# Patient Record
Sex: FEMALE | Race: WHITE | NOT HISPANIC OR LATINO | Employment: PART TIME | URBAN - METROPOLITAN AREA
[De-identification: names, ages, dates, MRNs, and addresses within clinical notes are randomized per-mention and may not be internally consistent; named-entity substitution may affect disease eponyms.]

---

## 2018-02-06 ENCOUNTER — OFFICE VISIT (OUTPATIENT)
Dept: OPTOMETRY | Facility: CLINIC | Age: 43
End: 2018-02-06
Payer: COMMERCIAL

## 2018-02-06 DIAGNOSIS — H16.141 SUPERFICIAL PUNCTATE KERATITIS OF RIGHT EYE: Primary | ICD-10-CM

## 2018-02-06 PROCEDURE — 92002 INTRM OPH EXAM NEW PATIENT: CPT | Mod: S$GLB,,, | Performed by: OPTOMETRIST

## 2018-02-06 PROCEDURE — 99999 PR PBB SHADOW E&M-EST. PATIENT-LVL II: CPT | Mod: PBBFAC,,, | Performed by: OPTOMETRIST

## 2018-02-06 RX ORDER — NEOMYCIN SULFATE, POLYMYXIN B SULFATE AND DEXAMETHASONE 3.5; 10000; 1 MG/ML; [USP'U]/ML; MG/ML
1 SUSPENSION/ DROPS OPHTHALMIC 4 TIMES DAILY
Qty: 5 ML | Refills: 0 | Status: SHIPPED | OUTPATIENT
Start: 2018-02-06 | End: 2018-02-12

## 2018-02-06 NOTE — PROGRESS NOTES
HPI     Presenting Complaint: Pt here today for red irritated right eye x 5 days.   Pt states yesterday right eye was bright eye red. Pt states for the last 2   days started itching and today started having pain. Pt states eye was a   little crusty this morning.     (+) Pain level 2 today in right eye    Ophthalmic medication / drops:   Pt has tried visine and [facility] lubricant drops as needed. Pt states she used   a friend's gentamycin ointment 2 times 4 days ago.    (-) headaches  (-) diplopia   (-) flashes / (-) floaters      Last edited by Bobby Hawk, OD on 2/6/2018  2:52 PM. (History)            Assessment /Plan     For exam results, see Encounter Report.    Superficial punctate keratitis of right eye  -     neomycin-polymyxin-dexamethasone (MAXITROL) 3.5mg/mL-10,000 unit/mL-0.1 % DrpS; Place 1 drop into the right eye 4 (four) times daily.  Dispense: 5 mL; Refill: 0      SPK inferior cornea OD. Discussed findings. Start maxitrol: 1 gt qid OD, shake bottle well. Start OTC preservative free drops every 1-2 hrs. Return in 1 week for f/u, sooner if worsening or no alleviation.

## 2018-02-12 ENCOUNTER — OFFICE VISIT (OUTPATIENT)
Dept: OPTOMETRY | Facility: CLINIC | Age: 43
End: 2018-02-12
Payer: COMMERCIAL

## 2018-02-12 DIAGNOSIS — H52.7 REFRACTIVE ERROR: ICD-10-CM

## 2018-02-12 DIAGNOSIS — H16.141 SUPERFICIAL PUNCTATE KERATITIS OF RIGHT EYE: Primary | ICD-10-CM

## 2018-02-12 DIAGNOSIS — H04.123 DRY EYE SYNDROME, BILATERAL: ICD-10-CM

## 2018-02-12 PROCEDURE — 92014 COMPRE OPH EXAM EST PT 1/>: CPT | Mod: S$GLB,,, | Performed by: OPTOMETRIST

## 2018-02-12 PROCEDURE — 99999 PR PBB SHADOW E&M-EST. PATIENT-LVL II: CPT | Mod: PBBFAC,,, | Performed by: OPTOMETRIST

## 2018-02-12 NOTE — PROGRESS NOTES
HPI     Presenting Complaint: Pt here today for yearly eye exam and Superficial   punctate keratitis of right eye follow up.    (+) Pt states improved irritation. Pt states does not get the sticking   feeling in right eye. Pt states woke up with sticking in left eye this   morning, used lubricating drop and feels better.    Ophthalmic medication / drops:  (+) OTC preservative free art tears 3 to 4 times a day  (+) Maxitrol 4 x day right eye only    (-) headaches  (-) diplopia   (-) flashes / (-) floaters       Last edited by Bobby Hawk, OD on 2/12/2018  3:39 PM. (History)            Assessment /Plan     For exam results, see Encounter Report.    Superficial punctate keratitis of right eye    Dry eye syndrome, bilateral    Refractive error      Resolved SPK OD. ALEXUS OU. Discussed ocular affects of dry eyes. Recommend OTC Systane Balance artificial tears four times a day in OU. Discussed chronicity of ALEXUS. RTC if symptoms not alleviated by continued use of artificial tears.     Good uncorrected distance and near vision. Denies refraction, return prn for spec Rx.       RTC in 1 year for comprehensive eye exam, or sooner prn.

## 2018-05-07 ENCOUNTER — OFFICE VISIT (OUTPATIENT)
Dept: RHEUMATOLOGY | Facility: CLINIC | Age: 43
End: 2018-05-07
Payer: COMMERCIAL

## 2018-05-07 ENCOUNTER — TELEPHONE (OUTPATIENT)
Dept: NEUROLOGY | Facility: CLINIC | Age: 43
End: 2018-05-07

## 2018-05-07 ENCOUNTER — HOSPITAL ENCOUNTER (OUTPATIENT)
Dept: RADIOLOGY | Facility: HOSPITAL | Age: 43
Discharge: HOME | End: 2018-05-07
Attending: INTERNAL MEDICINE
Payer: COMMERCIAL

## 2018-05-07 VITALS
WEIGHT: 188.81 LBS | HEIGHT: 70 IN | SYSTOLIC BLOOD PRESSURE: 120 MMHG | DIASTOLIC BLOOD PRESSURE: 76 MMHG | BODY MASS INDEX: 27.03 KG/M2 | TEMPERATURE: 99 F | HEART RATE: 79 BPM

## 2018-05-07 DIAGNOSIS — H04.123 DRY EYES: ICD-10-CM

## 2018-05-07 DIAGNOSIS — R68.2 DRY MOUTH: ICD-10-CM

## 2018-05-07 DIAGNOSIS — M25.572 PAIN IN JOINTS OF BOTH FEET: Primary | ICD-10-CM

## 2018-05-07 DIAGNOSIS — M25.531 PAIN IN BOTH WRISTS: ICD-10-CM

## 2018-05-07 DIAGNOSIS — H53.2 DOUBLE VISION: ICD-10-CM

## 2018-05-07 DIAGNOSIS — R51.9 FREQUENT HEADACHES: ICD-10-CM

## 2018-05-07 DIAGNOSIS — R53.83 FATIGUE, UNSPECIFIED TYPE: ICD-10-CM

## 2018-05-07 DIAGNOSIS — R63.5 WEIGHT GAIN: ICD-10-CM

## 2018-05-07 DIAGNOSIS — M25.571 PAIN IN JOINTS OF BOTH FEET: Primary | ICD-10-CM

## 2018-05-07 DIAGNOSIS — R20.2 PARESTHESIAS: ICD-10-CM

## 2018-05-07 DIAGNOSIS — M25.572 PAIN IN JOINTS OF BOTH FEET: ICD-10-CM

## 2018-05-07 DIAGNOSIS — M25.532 PAIN IN BOTH WRISTS: ICD-10-CM

## 2018-05-07 DIAGNOSIS — M25.571 PAIN IN JOINTS OF BOTH FEET: ICD-10-CM

## 2018-05-07 PROCEDURE — 77077 JOINT SURVEY SINGLE VIEW: CPT | Mod: TC

## 2018-05-07 PROCEDURE — 99205 OFFICE O/P NEW HI 60 MIN: CPT | Mod: S$GLB,,, | Performed by: INTERNAL MEDICINE

## 2018-05-07 PROCEDURE — 3008F BODY MASS INDEX DOCD: CPT | Mod: CPTII,S$GLB,, | Performed by: INTERNAL MEDICINE

## 2018-05-07 PROCEDURE — 71046 X-RAY EXAM CHEST 2 VIEWS: CPT | Mod: 26,,, | Performed by: RADIOLOGY

## 2018-05-07 PROCEDURE — 77077 JOINT SURVEY SINGLE VIEW: CPT | Mod: 26,,, | Performed by: RADIOLOGY

## 2018-05-07 PROCEDURE — 99999 PR PBB SHADOW E&M-EST. PATIENT-LVL III: CPT | Mod: PBBFAC,,, | Performed by: INTERNAL MEDICINE

## 2018-05-07 PROCEDURE — 71046 X-RAY EXAM CHEST 2 VIEWS: CPT | Mod: TC

## 2018-05-07 RX ORDER — MECLIZINE HYDROCHLORIDE 25 MG/1
TABLET ORAL 2 TIMES DAILY PRN
COMMUNITY
Start: 2017-01-25 | End: 2020-09-24

## 2018-05-07 RX ORDER — NITROFURANTOIN 25; 75 MG/1; MG/1
CAPSULE ORAL 2 TIMES DAILY
COMMUNITY
End: 2018-07-24 | Stop reason: SDUPTHER

## 2018-05-07 RX ORDER — VALACYCLOVIR HYDROCHLORIDE 1 G/1
TABLET, FILM COATED ORAL CONTINUOUS PRN
COMMUNITY
End: 2020-09-24 | Stop reason: SDUPTHER

## 2018-05-07 ASSESSMENT — ROUTINE ASSESSMENT OF PATIENT INDEX DATA (RAPID3)
PAIN SCORE: 3
MDHAQ FUNCTION SCORE: .1
PSYCHOLOGICAL DISTRESS SCORE: 1.1
WHEN YOU AWAKENED IN THE MORNING OVER THE LAST WEEK, PLEASE INDICATE THE AMOUNT OF TIME IT TAKES UNTIL YOU ARE AS LIMBER AS YOU WILL BE FOR THE DAY: 1 HOUR
AM STIFFNESS SCORE: 1, YES
TOTAL RAPID3 SCORE: 3.11
PATIENT GLOBAL ASSESSMENT SCORE: 6
FATIGUE SCORE: 8

## 2018-05-07 NOTE — PROGRESS NOTES
Subjective:       Patient ID: Arcelia Moss is a 42 y.o. female.    Chief Complaint: Joint pain.    HPI:  Arcelia Moss is a 42 y.o. female reports 2017 had left wrist pain x3 days resolved then same on right wrist. Saw nurse practitioner who did uric acid and rheumatoid arthritis test that was negative.  Lower back tingling (pins and needle) and numb with flush skin.  Then same on left side of back.  Four to five times a week awakens and feels one or 2 extremity is numb.    Two years ago fell down carpeted steps.  Thinks foot slipped.  No incontinence of bowel.  Incontinence of bladder with laughing.  Feels tight in general.    Hands swell.  Had to get new wedding ring.  In morning feet intermittently stiff and hurt.  Pain in hands and feet 5-6/10 ache 3x a week.  Improves with continue movement, ice and elevation.  Unable to run anymore due to foot pain and ankle swelling.  Right eye blood shot and irritated recently.  Eye doctor noted both eyes dry.  Very fatigued despite amount of sleep.  Gained 25 pounds in last year.    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  Symptoms returning but not as bad.  Morning stiffness on average for 1 hour.    Cystic acne treated for 6 months to a year in 2013 treated with clindamycin/treintoin.  Pregnancy in 2005 complicated by eczema and pregnancy induced Menieres.    Primary gave meclizine for dizziness last year but has not used.    Six days lately of tingling in back.  Double vision with headaches.  2-3 headaches a month that cause her not to function.  Sensitive to light, smells, blurry vision, floaters and flashing lights with headaches.      Lupus Review of Systems  Alopecia: no  Photosensitivity: no   Raynaud's: no  Oral or nasal ulcers: no (fever blister on lips)  Rashes: Redness of back  No pleurisy or pericarditis.  No seizures,  "psychosis, or stroke.  No venous or arterial clots.  Pregnancy hx (if applicable): one miscarriage at 9 weeks in 4/2005; Two successful pregnancies after.  Twin pregnancy complicated by HTN and pregnancy induced Menieres delivered 1 month early             Review of Systems   Constitutional: Positive for fatigue and unexpected weight change (25 pounds weight gain).   HENT:        Dry mouth   Eyes: Positive for redness.        Dry eyes   Respiratory: Positive for cough.         Allergies   Cardiovascular: Negative.    Gastrointestinal:        Difficulty swallowing to solids   Endocrine: Negative.    Genitourinary: Negative.    Musculoskeletal: Positive for arthralgias and joint swelling.   Skin: Positive for color change.   Allergic/Immunologic: Negative.    Neurological: Positive for headaches.        Tingling in left side of back   Hematological: Negative.    Psychiatric/Behavioral: Negative.          Objective:   /76 (BP Location: Right arm, Patient Position: Sitting, BP Method: Small (Automatic))   Pulse 79   Temp 98.5 °F (36.9 °C) (Oral)   Ht 5' 9.5" (1.765 m)   Wt 85.6 kg (188 lb 12.8 oz)   LMP 04/23/2018   BMI 27.48 kg/m²      Physical Exam   Constitutional: She is oriented to person, place, and time and well-developed, well-nourished, and in no distress.   HENT:   Head: Normocephalic.   Eyes: Conjunctivae and EOM are normal.   Neck: Neck supple.   Cardiovascular: Normal rate, regular rhythm and normal heart sounds.    Pulmonary/Chest: Effort normal and breath sounds normal.   Abdominal: Soft. Bowel sounds are normal.   Neurological: She is alert and oriented to person, place, and time. Gait normal.   Skin: Skin is warm and dry.     Psychiatric: Mood and affect normal.   Musculoskeletal:   28 joint count: 0 swollen and 0 tender            Assessment:       1.  Joint pains  2.  Fatigue  3.  Paresthesias  4.  Back pain  5.  Dry eyes and mouth  6.  Weight gain  7.  Dysphagia  8.  Headaches  9.  Double " vision  Plan:       1.  Labs  2.  CXR  3.  Consult neurology for frequent headaches, double vision and paresthesias    RTO 3 months/prn

## 2018-05-07 NOTE — TELEPHONE ENCOUNTER
----- Message from Estelle Arriaga sent at 5/7/2018  3:00 PM CDT -----  Contact: Pt can be reached at [phone]  Pt is calling to schedule an appt for MS.  Pt was referred by Dr.T Magana      Please contact pt asap   Thank you!

## 2018-05-08 ENCOUNTER — LAB VISIT (OUTPATIENT)
Dept: LAB | Facility: HOSPITAL | Age: 43
End: 2018-05-08
Attending: INTERNAL MEDICINE
Payer: COMMERCIAL

## 2018-05-08 ENCOUNTER — PATIENT MESSAGE (OUTPATIENT)
Dept: RHEUMATOLOGY | Facility: CLINIC | Age: 43
End: 2018-05-08

## 2018-05-08 DIAGNOSIS — R53.83 FATIGUE, UNSPECIFIED TYPE: ICD-10-CM

## 2018-05-08 DIAGNOSIS — H53.2 DOUBLE VISION: ICD-10-CM

## 2018-05-08 DIAGNOSIS — M25.531 PAIN IN BOTH WRISTS: ICD-10-CM

## 2018-05-08 DIAGNOSIS — M25.532 PAIN IN BOTH WRISTS: ICD-10-CM

## 2018-05-08 DIAGNOSIS — R68.2 DRY MOUTH: ICD-10-CM

## 2018-05-08 DIAGNOSIS — R20.2 PARESTHESIAS: ICD-10-CM

## 2018-05-08 DIAGNOSIS — H04.123 DRY EYES: ICD-10-CM

## 2018-05-08 DIAGNOSIS — R51.9 FREQUENT HEADACHES: ICD-10-CM

## 2018-05-08 DIAGNOSIS — M25.571 PAIN IN JOINTS OF BOTH FEET: ICD-10-CM

## 2018-05-08 DIAGNOSIS — R63.5 WEIGHT GAIN: ICD-10-CM

## 2018-05-08 DIAGNOSIS — M25.572 PAIN IN JOINTS OF BOTH FEET: ICD-10-CM

## 2018-05-08 LAB
25(OH)D3+25(OH)D2 SERPL-MCNC: 27 NG/ML
ALBUMIN SERPL BCP-MCNC: 3.8 G/DL
ALP SERPL-CCNC: 68 U/L
ALT SERPL W/O P-5'-P-CCNC: 14 U/L
ANION GAP SERPL CALC-SCNC: 7 MMOL/L
AST SERPL-CCNC: 15 U/L
BASOPHILS # BLD AUTO: 0 K/UL
BASOPHILS NFR BLD: 0.5 %
BILIRUB SERPL-MCNC: 0.5 MG/DL
BUN SERPL-MCNC: 13 MG/DL
CALCIUM SERPL-MCNC: 9.2 MG/DL
CCP AB SER IA-ACNC: <0.5 U/ML
CHLORIDE SERPL-SCNC: 103 MMOL/L
CK SERPL-CCNC: 77 U/L
CO2 SERPL-SCNC: 29 MMOL/L
CREAT SERPL-MCNC: 0.8 MG/DL
CRP SERPL-MCNC: 0.6 MG/L
DIFFERENTIAL METHOD: ABNORMAL
EOSINOPHIL # BLD AUTO: 0.4 K/UL
EOSINOPHIL NFR BLD: 5.7 %
ERYTHROCYTE [DISTWIDTH] IN BLOOD BY AUTOMATED COUNT: 13.2 %
ERYTHROCYTE [SEDIMENTATION RATE] IN BLOOD BY WESTERGREN METHOD: 5 MM/HR
EST. GFR  (AFRICAN AMERICAN): >60 ML/MIN/1.73 M^2
EST. GFR  (NON AFRICAN AMERICAN): >60 ML/MIN/1.73 M^2
GLUCOSE SERPL-MCNC: 82 MG/DL
HCT VFR BLD AUTO: 37.6 %
HGB BLD-MCNC: 12.4 G/DL
LYMPHOCYTES # BLD AUTO: 1.6 K/UL
LYMPHOCYTES NFR BLD: 23.4 %
MCH RBC QN AUTO: 28.6 PG
MCHC RBC AUTO-ENTMCNC: 33.1 G/DL
MCV RBC AUTO: 86 FL
MONOCYTES # BLD AUTO: 0.4 K/UL
MONOCYTES NFR BLD: 5.2 %
NEUTROPHILS # BLD AUTO: 4.5 K/UL
NEUTROPHILS NFR BLD: 65.2 %
PLATELET # BLD AUTO: 234 K/UL
PMV BLD AUTO: 8 FL
POTASSIUM SERPL-SCNC: 3.6 MMOL/L
PROT SERPL-MCNC: 7 G/DL
RBC # BLD AUTO: 4.35 M/UL
RHEUMATOID FACT SERPL-ACNC: <10 IU/ML
SODIUM SERPL-SCNC: 139 MMOL/L
TSH SERPL DL<=0.005 MIU/L-ACNC: 0.95 UIU/ML
WBC # BLD AUTO: 6.9 K/UL

## 2018-05-08 PROCEDURE — 86431 RHEUMATOID FACTOR QUANT: CPT

## 2018-05-08 PROCEDURE — 86140 C-REACTIVE PROTEIN: CPT

## 2018-05-08 PROCEDURE — 86200 CCP ANTIBODY: CPT

## 2018-05-08 PROCEDURE — 85651 RBC SED RATE NONAUTOMATED: CPT

## 2018-05-08 PROCEDURE — 84443 ASSAY THYROID STIM HORMONE: CPT

## 2018-05-08 PROCEDURE — 86235 NUCLEAR ANTIGEN ANTIBODY: CPT

## 2018-05-08 PROCEDURE — 80074 ACUTE HEPATITIS PANEL: CPT

## 2018-05-08 PROCEDURE — 85025 COMPLETE CBC W/AUTO DIFF WBC: CPT

## 2018-05-08 PROCEDURE — 36415 COLL VENOUS BLD VENIPUNCTURE: CPT

## 2018-05-08 PROCEDURE — 80053 COMPREHEN METABOLIC PANEL: CPT

## 2018-05-08 PROCEDURE — 86038 ANTINUCLEAR ANTIBODIES: CPT

## 2018-05-08 PROCEDURE — 82306 VITAMIN D 25 HYDROXY: CPT

## 2018-05-08 PROCEDURE — 82550 ASSAY OF CK (CPK): CPT

## 2018-05-08 PROCEDURE — 86703 HIV-1/HIV-2 1 RESULT ANTBDY: CPT

## 2018-05-08 NOTE — TELEPHONE ENCOUNTER
----- Message from Ansley Mustafa sent at 5/7/2018  4:14 PM CDT -----  Contact: Pt  Good afternoon I was not sure where to send the message directly, ort to what pool.     Call back number: Patient Returning Call    Who Called: Pt  Who Left Message for Patient: Peggy  Does the patient know what this is regarding?: Scheduling an apt  Communication Preference: [phone]  Additional Information: N/A

## 2018-05-08 NOTE — TELEPHONE ENCOUNTER
Call returned to pt. Pt states that she is having some vague but persistent neurological symptoms and is being referred by her rheumatologist. Screening labs done today. Appt scheduled with Dr Bearden on 5/24 at 8am. Pt is agreeable to this and is aware of our location.

## 2018-05-09 ENCOUNTER — PATIENT MESSAGE (OUTPATIENT)
Dept: RHEUMATOLOGY | Facility: CLINIC | Age: 43
End: 2018-05-09

## 2018-05-09 LAB
ANA SER QL IF: NORMAL
HAV IGM SERPL QL IA: NEGATIVE
HBV CORE IGM SERPL QL IA: NEGATIVE
HBV SURFACE AG SERPL QL IA: NEGATIVE
HCV AB SERPL QL IA: NEGATIVE
HIV 1+2 AB+HIV1 P24 AG SERPL QL IA: NEGATIVE

## 2018-05-10 LAB
ANTI-SSA ANTIBODY: 5.18 EU
ANTI-SSA INTERPRETATION: NEGATIVE

## 2018-05-11 ENCOUNTER — PATIENT MESSAGE (OUTPATIENT)
Dept: RHEUMATOLOGY | Facility: CLINIC | Age: 43
End: 2018-05-11

## 2018-05-24 ENCOUNTER — OFFICE VISIT (OUTPATIENT)
Dept: NEUROLOGY | Facility: CLINIC | Age: 43
End: 2018-05-24
Payer: COMMERCIAL

## 2018-05-24 ENCOUNTER — LAB VISIT (OUTPATIENT)
Dept: LAB | Facility: HOSPITAL | Age: 43
End: 2018-05-24
Attending: PSYCHIATRY & NEUROLOGY
Payer: COMMERCIAL

## 2018-05-24 VITALS
BODY MASS INDEX: 26.69 KG/M2 | DIASTOLIC BLOOD PRESSURE: 83 MMHG | WEIGHT: 186.44 LBS | SYSTOLIC BLOOD PRESSURE: 130 MMHG | HEIGHT: 70 IN | HEART RATE: 84 BPM

## 2018-05-24 DIAGNOSIS — R20.2 PARESTHESIA AND PAIN OF BOTH UPPER EXTREMITIES: ICD-10-CM

## 2018-05-24 DIAGNOSIS — M79.602 PARESTHESIA AND PAIN OF BOTH UPPER EXTREMITIES: Primary | ICD-10-CM

## 2018-05-24 DIAGNOSIS — R29.818 FOCAL NEUROLOGICAL DEFICIT: ICD-10-CM

## 2018-05-24 DIAGNOSIS — R20.2 PARESTHESIA AND PAIN OF BOTH UPPER EXTREMITIES: Primary | ICD-10-CM

## 2018-05-24 DIAGNOSIS — M79.601 PARESTHESIA AND PAIN OF BOTH UPPER EXTREMITIES: ICD-10-CM

## 2018-05-24 DIAGNOSIS — R53.83 OTHER FATIGUE: ICD-10-CM

## 2018-05-24 DIAGNOSIS — R26.81 GAIT INSTABILITY: ICD-10-CM

## 2018-05-24 DIAGNOSIS — M79.601 PARESTHESIA AND PAIN OF BOTH UPPER EXTREMITIES: Primary | ICD-10-CM

## 2018-05-24 DIAGNOSIS — M79.602 PARESTHESIA AND PAIN OF BOTH UPPER EXTREMITIES: ICD-10-CM

## 2018-05-24 LAB
C3 SERPL-MCNC: 128 MG/DL
C4 SERPL-MCNC: 18 MG/DL
CERULOPLASMIN SERPL-MCNC: 28 MG/DL
FOLATE SERPL-MCNC: 11.5 NG/ML
HCYS SERPL-SCNC: 8.8 UMOL/L
RHEUMATOID FACT SERPL-ACNC: <10 IU/ML
T4 FREE SERPL-MCNC: 0.98 NG/DL
VIT B12 SERPL-MCNC: 638 PG/ML

## 2018-05-24 PROCEDURE — 3008F BODY MASS INDEX DOCD: CPT | Mod: CPTII,S$GLB,, | Performed by: PSYCHIATRY & NEUROLOGY

## 2018-05-24 PROCEDURE — 83090 ASSAY OF HOMOCYSTEINE: CPT

## 2018-05-24 PROCEDURE — 82525 ASSAY OF COPPER: CPT

## 2018-05-24 PROCEDURE — 86160 COMPLEMENT ANTIGEN: CPT

## 2018-05-24 PROCEDURE — 85613 RUSSELL VIPER VENOM DILUTED: CPT

## 2018-05-24 PROCEDURE — 86618 LYME DISEASE ANTIBODY: CPT

## 2018-05-24 PROCEDURE — 86592 SYPHILIS TEST NON-TREP QUAL: CPT

## 2018-05-24 PROCEDURE — 86147 CARDIOLIPIN ANTIBODY EA IG: CPT

## 2018-05-24 PROCEDURE — 99999 PR PBB SHADOW E&M-EST. PATIENT-LVL III: CPT | Mod: PBBFAC,,, | Performed by: PSYCHIATRY & NEUROLOGY

## 2018-05-24 PROCEDURE — 84439 ASSAY OF FREE THYROXINE: CPT

## 2018-05-24 PROCEDURE — 86146 BETA-2 GLYCOPROTEIN ANTIBODY: CPT | Mod: 59

## 2018-05-24 PROCEDURE — 86334 IMMUNOFIX E-PHORESIS SERUM: CPT

## 2018-05-24 PROCEDURE — 84165 PROTEIN E-PHORESIS SERUM: CPT | Mod: 26,,, | Performed by: PATHOLOGY

## 2018-05-24 PROCEDURE — 99205 OFFICE O/P NEW HI 60 MIN: CPT | Mod: S$GLB,,, | Performed by: PSYCHIATRY & NEUROLOGY

## 2018-05-24 PROCEDURE — 84165 PROTEIN E-PHORESIS SERUM: CPT

## 2018-05-24 PROCEDURE — 82607 VITAMIN B-12: CPT

## 2018-05-24 PROCEDURE — 86431 RHEUMATOID FACTOR QUANT: CPT

## 2018-05-24 PROCEDURE — 86160 COMPLEMENT ANTIGEN: CPT | Mod: 59

## 2018-05-24 PROCEDURE — 86334 IMMUNOFIX E-PHORESIS SERUM: CPT | Mod: 26,,, | Performed by: PATHOLOGY

## 2018-05-24 PROCEDURE — 83516 IMMUNOASSAY NONANTIBODY: CPT | Mod: 59

## 2018-05-24 PROCEDURE — 86255 FLUORESCENT ANTIBODY SCREEN: CPT | Mod: 91

## 2018-05-24 PROCEDURE — 82746 ASSAY OF FOLIC ACID SERUM: CPT

## 2018-05-24 PROCEDURE — 82390 ASSAY OF CERULOPLASMIN: CPT

## 2018-05-24 PROCEDURE — 84630 ASSAY OF ZINC: CPT

## 2018-05-24 PROCEDURE — 36415 COLL VENOUS BLD VENIPUNCTURE: CPT

## 2018-05-24 PROCEDURE — 86225 DNA ANTIBODY NATIVE: CPT

## 2018-05-24 RX ORDER — CHOLECALCIFEROL (VITAMIN D3) 25 MCG
1000 TABLET ORAL DAILY
COMMUNITY

## 2018-05-24 NOTE — LETTER
May 29, 2018      Stella Rock MD  [address]  [facility] [address]           [facility]  [address]  [facility] [address]  Phone: [phone]          Patient: Arcelia Moss   MR Number: [id number]   YOB: 1975   Date of Visit: 5/24/2018       Dear Dr. Stella Rock:    Thank you for referring Arcelia Moss to me for evaluation. Attached you will find relevant portions of my assessment and plan of care.    If you have questions, please do not hesitate to call me. I look forward to following Arcelia Moss along with you.    Sincerely,    Mindi Bearden MD    Enclosure  CC:  No Recipients    If you would like to receive this communication electronically, please contact [email or web address] or [phone] to request more information on [employer] [facility] access.    For providers and/or their staff who would like to refer a patient to [facility], please contact us through our one-stop-shop provider referral line, [facility], at [phone].    If you feel you have received this communication in error or would no longer like to receive these types of communications, please e-mail [email or web address]

## 2018-05-24 NOTE — PROGRESS NOTES
"I saw Arcelia Moss as a new patient today for multiple sclerosis evaluation.  She comes in to [facility] care and is referred by Dr. Magana.  She is accompanied by no one.     History of Present Illness  The patient is a 42 y.o. RH female with chronic headaches, dry eyes, polycystic ovaries, and occasional joint pain/swelling, who presents for MS evaluation.  Possibly onset of neurologic symptoms was about 2 years ago. While fishing, she noticed a flushed [place] on her back (no bullseye) just below the closure of her bra strap that was numb and tingly. It went away the next day. No known bug bites and family didn't notice any marks when screening her skin either. Then summer 2017, she had numbness and tightness in lower back that eventually resolved. Skin was flushed at that time. In the last 6-9 months, she noticed N/T - mostly on left side of body than right. Whenever she wakes up, it seems one of her extremities will be asleep. Right foot and hand goes numb sometimes, but left side is more frequent. Twice upon awakening, left leg from knee down felt like it was asleep. Leg felt so numb that she couldn't move it and had to manually move it with her hand. Afterwards feeling came back. There was one 2 week period where she had consistent n/t from left shoulder to waist. Since then, the will have numbness on left side, but may only be part of her torso. Also has an episode of ulnar distribution n/t in hand and forearm for about an hour, then there was heaviness for about 1 day.   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    Followed with rheumatology for:  2.5 years ago - left wrist pain and swelling, resolved with ibuprofen. One week later, right wrist with same symptoms, resolved with splint and ibuprofen as well. Summer 2016 - tightness in lower back. Felt like "back labor," which still happens infrequently but left side > right. For past " "1-2 years, she will wake up with joint stiffness and feet and ankle mild to moderate swelling.     Review of systems:   Vertigo/Dizziness: more than 1.5 years ago, would have dizzy spells. Prescribed antivert. Last taken 9 months ago, but she did have a vertiginous spell in past week. Diagnosed with pregnancy induced Meniere's in previous pregnancy.  Headaches: chronic, thinks it's mostly sinus headaches and infrequently has "migraine-like" headaches with eye pain and dull lights in vision  Visual Symptoms: Yes - diagnosed with dry eye and right keratitis   Bladder Dysfunction: Yes - stress incontinence after childbirth   Bowel Dysfunction: No current constipation/incontinence/diarrhea  Pain: Yes - as per hpi   Skin Breakdown: Yes - occasionally gets cystic lesions, treated by dermatology   Cognitive: in past year, she has had some difficulty with memory. Believes that it's related to stress  Dysphagia: Yes - esophageal tightening. 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 Fell down 4-5 stairs about 2 years ago (mechanical), and fell down back steps while rushing out of house about one year ago  Mood Disorder: worries a lot, has always been a generally anxious person.   Fatigue: Yes - increased, doesn't matter how much sleep she's gotten.   Sleep Disturbance: No. No known snoring or apnea.  Sexual Dysfunction: Not Assessed   Heat sensitivity: No, but does get overheated faster      Past Medical History:   Diagnosis Date    Meniere disease     Pregnancy induced    Polycystic ovarian syndrome        Past Surgical History:   Procedure Laterality Date     SECTION      DILATION AND CURETTAGE OF UTERUS      TUBAL LIGATION      then reversal due to pain     tubal reversal      Due to pain after tubal ligation " "      Family History   Problem Relation Age of Onset    Osteoarthritis Mother     Meniere's disease Paternal Aunt     Kidney disease Maternal Grandmother     Diabetes Mellitus Maternal Grandmother     Hyperlipidemia Maternal Grandmother     Hypertension Maternal Grandmother     Ovarian cancer Neg Hx     Colon cancer Neg Hx     Breast cancer Neg Hx     Glaucoma Neg Hx     Inflammatory bowel disease Neg Hx     Lupus Neg Hx     Psoriasis Neg Hx     Thyroid disease Neg Hx        Social History     Social History    Marital status:      Spouse name: N/A    Number of children: N/A    Years of education: N/A     Social History Main Topics    Smoking status: Never Smoker    Smokeless tobacco: Never Used      Comment: ; counselor    Alcohol use No    Drug use: No    Sexual activity: Yes     Partners: Male     Birth control/ protection: Surgical      Comment:  to [employer]     Other Topics Concern    Not on file     Social History Narrative    No narrative on file     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    NITROFURANTOIN, MACROCRYSTAL-MONOHYDRATE, (MACROBID) 100 MG CAPSULE    Take by mouth.    VALACYCLOVIR (VALTREX) 1000 MG TABLET    Take by mouth.    VITAMIN D 1000 UNITS TAB    Take 1,000 Units by mouth once daily.         Physical Exam    Vitals:    05/24/18 0808   BP: 130/83   Pulse: 84   Weight: 84.6 kg (186 lb 6.7 oz)   Height: 5' 9.5" (1.765 m)       In general, the patient is well nourished.    Fundi are normal bilaterally.    MENTAL STATUS: language is fluent, normal verbal comprehension, short-term and remote memory is intact, " attention is normal, patient is alert and oriented x 3, fund of knowlege is appropriate by vocabulary.     CRANIAL NERVE EXAM:  There is no internuclear ophthalmoplegia.  Extraocular muscles are intact. Pupils are equal, round, and reactive to light. No facial asymmetry. Facial sensation is intact bilaterally. There is no dysarthria. Uvula is midline, and palate moves symmetrically. Shoulder shrug intact bilaterlly. Tongue protrusion is midline. Hearing is grossly intact.    MOTOR EXAM: Normal bulk and tone throughout UE and LE bilaterally.   No pronator drift; rapid sequential movements are normal; Strength is  5/5 in all groups in the lower extremities and upper extremities;    REFLEXES: 2+ and symmetric throughout in all four extremeties; toes are down bilaterally    SENSORY EXAM: Normal to light touch, pinprick throughout. Decreased PP sensation on left back from shoulder to lumbar region compared to right.    COORDINATION: Normal finger-to-nose and HKS exam     GAIT: Narrow based and stable. Heel/toe/tandem intact        IMAGING (personally reviewed):      LABS:  Lab Results   Component Value Date    WBC 6.90 05/08/2018    HGB 12.4 05/08/2018    HCT 37.6 05/08/2018    MCV 86 05/08/2018     05/08/2018         Chemistry        Component Value Date/Time     05/08/2018 1115    K 3.6 05/08/2018 1115     05/08/2018 1115    CO2 29 05/08/2018 1115    BUN 13 05/08/2018 1115    CREATININE 0.8 05/08/2018 1115    GLU 82 05/08/2018 1115        Component Value Date/Time    CALCIUM 9.2 05/08/2018 1115    ALKPHOS 68 05/08/2018 1115    AST 15 05/08/2018 1115    ALT 14 05/08/2018 1115    BILITOT 0.5 05/08/2018 1115    ESTGFRAFRICA >60 05/08/2018 1115    EGFRNONAA >60 05/08/2018 1115            Lab Results   Component Value Date    ALBUMIN 3.8 05/08/2018 5/8/18 - normal/unremarkable: SIMONE, hep panel, HIV, TSH, SSA, RF, ESR, CCP, CK, CRP, CMP, CBC. Low- Vit D 27      Diagnosis/Assessment/Plan:       1.  Paresthesias, gait instability, fatigue, intermittent LE weakness  · Assessment: cannot rule out MS vs MS mimickers. Will assess for both at this time.   · Imaging: MRI brain, c/t spine w/wo  · Labs: anti-ds dna ab, anca, lyme abs, beta2 glycoproteins, c3/c4 complement, cardiolipin ab, ceruloplasm, copper, drvvt, folate, gliadin ab, homocysteine, spep/karin, RF, RPR, T4, vit B12, zinc  · Continue vit D supplementation      Over 50% of the 80 min appt was spent counseling the patient about further w/u and diagnoses.    F/u in 2-4 weeks after imaging    Mindi Bearden MD

## 2018-05-25 LAB
ALBUMIN SERPL ELPH-MCNC: 4.11 G/DL
ALPHA1 GLOB SERPL ELPH-MCNC: 0.27 G/DL
ALPHA2 GLOB SERPL ELPH-MCNC: 0.74 G/DL
ANCA AB TITR SER IF: NORMAL TITER
B BURGDOR AB SER IA-ACNC: 0.1 INDEX VALUE
B-GLOBULIN SERPL ELPH-MCNC: 0.9 G/DL
CARDIOLIPIN IGG SER IA-ACNC: <9.4 GPL
CARDIOLIPIN IGM SER IA-ACNC: <9.4 MPL
DSDNA AB SER-ACNC: NORMAL [IU]/ML
GAMMA GLOB SERPL ELPH-MCNC: 1.38 G/DL
GLIADIN PEPTIDE IGA SER-ACNC: 7 UNITS
GLIADIN PEPTIDE IGG SER-ACNC: 3 UNITS
INTERPRETATION SERPL IFE-IMP: NORMAL
LA PPP-IMP: NEGATIVE
P-ANCA TITR SER IF: NORMAL TITER
PATHOLOGIST INTERPRETATION IFE: NORMAL
PATHOLOGIST INTERPRETATION SPE: NORMAL
PROT SERPL-MCNC: 7.4 G/DL
RPR SER QL: NORMAL
ZINC SERPL-MCNC: 68 UG/DL (ref 60–130)

## 2018-05-26 LAB
B2 GLYCOPROT1 IGA SER QL: <9 SAU
B2 GLYCOPROT1 IGG SER QL: <9 SGU
B2 GLYCOPROT1 IGM SER QL: <9 SMU

## 2018-05-29 LAB — COPPER SERPL-MCNC: 1190 UG/L (ref 810–1990)

## 2018-06-04 ENCOUNTER — PATIENT MESSAGE (OUTPATIENT)
Dept: NEUROLOGY | Facility: CLINIC | Age: 43
End: 2018-06-04

## 2018-06-15 ENCOUNTER — HOSPITAL ENCOUNTER (OUTPATIENT)
Dept: RADIOLOGY | Facility: HOSPITAL | Age: 43
Discharge: HOME | End: 2018-06-15
Attending: PSYCHIATRY & NEUROLOGY
Payer: COMMERCIAL

## 2018-06-15 DIAGNOSIS — M79.602 PARESTHESIA AND PAIN OF BOTH UPPER EXTREMITIES: ICD-10-CM

## 2018-06-15 DIAGNOSIS — R29.818 FOCAL NEUROLOGICAL DEFICIT: ICD-10-CM

## 2018-06-15 DIAGNOSIS — M79.601 PARESTHESIA AND PAIN OF BOTH UPPER EXTREMITIES: ICD-10-CM

## 2018-06-15 DIAGNOSIS — R20.2 PARESTHESIA AND PAIN OF BOTH UPPER EXTREMITIES: ICD-10-CM

## 2018-06-15 PROCEDURE — 72157 MRI CHEST SPINE W/O & W/DYE: CPT | Mod: TC

## 2018-06-15 PROCEDURE — 72157 MRI CHEST SPINE W/O & W/DYE: CPT | Mod: 26,,, | Performed by: RADIOLOGY

## 2018-06-15 PROCEDURE — 70553 MRI BRAIN STEM W/O & W/DYE: CPT | Mod: 26,,, | Performed by: RADIOLOGY

## 2018-06-15 PROCEDURE — 70553 MRI BRAIN STEM W/O & W/DYE: CPT | Mod: TC

## 2018-06-15 PROCEDURE — 72156 MRI NECK SPINE W/O & W/DYE: CPT | Mod: 26,,, | Performed by: RADIOLOGY

## 2018-06-15 PROCEDURE — A9585 GADOBUTROL INJECTION: HCPCS | Performed by: PSYCHIATRY & NEUROLOGY

## 2018-06-15 PROCEDURE — 25500020 PHARM REV CODE 255: Performed by: PSYCHIATRY & NEUROLOGY

## 2018-06-15 PROCEDURE — 72156 MRI NECK SPINE W/O & W/DYE: CPT | Mod: TC

## 2018-06-15 RX ORDER — GADOBUTROL 604.72 MG/ML
INJECTION INTRAVENOUS
Status: DISCONTINUED
Start: 2018-06-15 | End: 2018-06-16 | Stop reason: HOSPADM

## 2018-06-15 RX ORDER — GADOBUTROL 604.72 MG/ML
8 INJECTION INTRAVENOUS
Status: COMPLETED | OUTPATIENT
Start: 2018-06-15 | End: 2018-06-15

## 2018-06-15 RX ADMIN — GADOBUTROL 8 ML: 604.72 INJECTION INTRAVENOUS at 01:06

## 2018-06-22 ENCOUNTER — OFFICE VISIT (OUTPATIENT)
Dept: NEUROLOGY | Facility: CLINIC | Age: 43
End: 2018-06-22
Payer: COMMERCIAL

## 2018-06-22 VITALS
HEART RATE: 66 BPM | SYSTOLIC BLOOD PRESSURE: 111 MMHG | DIASTOLIC BLOOD PRESSURE: 70 MMHG | HEIGHT: 70 IN | WEIGHT: 190.81 LBS | BODY MASS INDEX: 27.32 KG/M2

## 2018-06-22 DIAGNOSIS — R53.83 OTHER FATIGUE: ICD-10-CM

## 2018-06-22 DIAGNOSIS — M48.02 CERVICAL STENOSIS OF SPINE: Primary | ICD-10-CM

## 2018-06-22 DIAGNOSIS — R20.2 PARESTHESIAS: ICD-10-CM

## 2018-06-22 DIAGNOSIS — R29.898 TRANSIENT WEAKNESS OF LEFT LEG: ICD-10-CM

## 2018-06-22 PROCEDURE — 99214 OFFICE O/P EST MOD 30 MIN: CPT | Mod: S$GLB,,, | Performed by: PSYCHIATRY & NEUROLOGY

## 2018-06-22 PROCEDURE — 3008F BODY MASS INDEX DOCD: CPT | Mod: CPTII,S$GLB,, | Performed by: PSYCHIATRY & NEUROLOGY

## 2018-06-22 PROCEDURE — 99999 PR PBB SHADOW E&M-EST. PATIENT-LVL III: CPT | Mod: PBBFAC,,, | Performed by: PSYCHIATRY & NEUROLOGY

## 2018-06-22 NOTE — PATIENT INSTRUCTIONS
Wellness Promotion in MS:    Enhancing wellness is a yao part of your overall multiple sclerosis (MS) treatment plan. The [facility] treatment team encourages you to maintain a healthy lifestyle and participate in wellness activities including a healthy diet, regular exercise and stress-reduction practices. Healthier people with MS maintain function and independence longer than those with diseases such as high blood pressure, diabetes, high cholesterol, and obesity. We encourage you to work closely with your primary care physician for routine health screenings and for treatment of your other health conditions.     Diet: Although there are several diets that are popular among people with MS such as the Swank, Erick, and Wahls diets, not enough research exists to prove that they help MS. Yet common sense tells us that eating a healthy diet that benefits overall health will likely also help MS. Studies show that obesity may be a risk factor for developing MS, and that having high blood pressure, high cholesterol, and diabetes cause MS to progress faster. Luckily, changes in diet can improve obesity, high blood pressure, high cholesterol, and diabetes.     Here are our recommendations: 1. Eat more fruits, vegetables, and fiber. 2. Choose healthy fats (from nuts, seeds, vegetable oils and oily fish) and lean sources of protein (chicken, turkey, soy products, fish, and beans). 3. Limit (or eliminate) sugar and processed foods.    Exercise: Research shows that regular exercise may improve fatigue, cognitive impairment, depression, mobility, and quality of life in people with MS. Exercise guidelines published by the MS Society of [place] advise that adults with MS  and mild to moderate disability get 30 minutes of moderate-intensity aerobic exercise twice a week, and do strength training exercises twice a week. A physical or occupational therapist can recommend a personalized exercise plan based on your needs,  abilities, and interests. Modified exercise programs, such as seated yoga or aquatic exercise, or adaptive equipment such as a recumbent bicycle or tricycle make exercise possible for nearly everyone with Ms.    Stress-reduction practices: Studies show that mind-body connection activities reduce MS symptoms and improve quality of life. They can also be helpful for depression and other mood disorders that are common in MS. Mindfulness, meditation, acupuncture and massage are all examples of stress-reduction practices that strengthen the mind-body connections and have been shown to help people with MS manage stress, depression, and anxiety. Staying physically active with aerobic exercise, resistance training, yoga, and sherry chi can also help reduce depression and help you feel better by reducing stress.    Vitamin D: Evidence shows that low vitamin D increases the risk of developing MS and may make MS relapses more severe. Therefore taking vitamin D may make your MS better. You can get vitamin D from sunlight and from foods including oily fish (salmon, mackerel, sardines, herring, and trout), egg yolks, and cod liver oil. In addition, some dairy products, cereals, and juices have been fortified with vitamin D. Vitamin D levels are measured with a blood test. We suggest at least maintaining a vitamin D level in the upper half of the normal range for your laboratory. Many people with MS have low vitamin D levels in the blood, despite a good diet and sun exposure, and may need to take vitamin D3 capsules or tablets to achieve the desired blood levels of vitamin D.    Smoking: Smoking increases the risk of both getting MS and of developing secondary-progressive MS. Smoking may make disability levels progress faster, and may make certain disease modifying therapies less effective. Quitting smoking is necessary to treat your MS and your overall health.    Sleep: Sleep is key to maintaining health. Many individuals with MS  report significant disruptions in sleep and difficulty maintaining a consistent sleep schedule. Please ask for a Sleep Hygiene Information Sheet for additional advice.    Additional Resources:  http://www.nationalmssociety.org/Living-Well- With-MS/Health- Wellness  http://www.nationalmssociety.org/Resources-Support/Unqomln-Mvcexqfqr-Qdzmzhfj/Brochures/Staying- Well

## 2018-06-22 NOTE — PROGRESS NOTES
Subjective:       Patient ID: Arcelia Moss is a 42 y.o. female who presents today for a routine clinic visit for follow up for MS evaluation.      HPI - taken 5/24/18:  42 y.o. RH female with chronic headaches, dry eyes, polycystic ovaries, and occasional joint pain/swelling, who presents for MS evaluation.  Possibly onset about 2 years ago. While fishing, she noticed a flushed Douglas on her back (no bullseye) that was numb and tingly that went away the next day. No known bug bites and family didn't notice any marks when screening her skin either. Then summer 2017, she had numbness and tightness in lower back that eventually resolved. In the last 6-9 months, she noticed N/T of L>R side of body. Upon awakening, it seems one of her extremities will be asleep. Right foot and hand goes numb sometimes, but left side is more frequent. Twice upon awakening, left leg from knee down felt like it was asleep. Leg felt so numb that she couldn't move it and had to manually move it with her hand. Afterwards feeling came back. There was one 2 week period where she had consistent n/t from left shoulder to waist. Since then, the will have numbness on left side, but may only be part of her torso. Also has an episode of ulnar distribution n/t in hand and forearm for about an hour, then there was heaviness for about 1 day.  Some stressors:  deployed from Jan to mid-May. Went back to work full time last fall and dropped to part time in Jan 2018 due to increased stress and duties with  gone.      Today/Subjective:  · Taking vitamin D3 as recommended? Yes  Dose: 1000 units daily  · No new symptoms on today. Here to discuss results of imaging and rest of work-up.    SOCIAL HISTORY  Social History   Substance Use Topics    Smoking status: Never Smoker    Smokeless tobacco: Never Used      Comment: ; counselor    Alcohol use No     Living arrangements - the patient lives with her family.  Employment -  "counselor    ROS:  · Vertigo/Dizziness: more than 1.5 years ago, would have dizzy spells. Prescribed antivert. Last taken 9 months ago, but she did have a vertiginous spell in past week. Diagnosed with pregnancy induced Meniere's in previous pregnancy.  · Headaches: chronic, thinks it's mostly sinus headaches and infrequently has "migraine-like" headaches with eye pain and dull lights in vision  · Visual Symptoms: Yes - diagnosed with dry eye and right keratitis   · Bladder Dysfunction: Yes - stress incontinence after childbirth   · Bowel Dysfunction: No current constipation/incontinence/diarrhea  · Pain: Yes - as per hpi   · Skin Breakdown: Yes - occasionally gets cystic lesions, treated by dermatology   · Cognitive: in past year, she has had some difficulty with memory. Believes that it's related to stress  · Dysphagia: Yes - esophageal tightening. 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 Fell down 4-5 stairs about 2 years ago (mechanical), and fell down back steps while rushing out of house about one year ago  · Mood Disorder: worries a lot, has always been a generally anxious person.   · Fatigue: Yes - increased, doesn't matter how much sleep she's gotten.   · Sleep Disturbance: No. No known snoring or apnea.  · Sexual Dysfunction: Not Assessed   · Heat sensitivity: No, but does get overheated faster    Current Outpatient Prescriptions on File Prior to Visit   Medication Sig Dispense Refill Last Dose    meclizine (ANTIVERT) 25 mg tablet Take by mouth 2 (two) times daily as needed.    Taking    nitrofurantoin, macrocrystal-monohydrate, (MACROBID) 100 MG capsule Take by mouth 2 (two) times daily.    Taking    valACYclovir (VALTREX) 1000 MG tablet Take by mouth continuous prn.    Taking    vitamin D 1000 " units Tab Take 1,000 Units by mouth once daily.   Taking           Objective:        Neurologic Exam      MENTAL STATUS: grossly intact. Normal language, attention, and memory.   CRANIAL NERVE EXAM: Extraocular muscles are intact.  No facial asymmetry. tongue midline. Shoulder shrug normal b/l There is no dysarthria.   MOTOR EXAM: Strength is 5/5 in all groups in the lower extremities and upper extremities.   SENSORY EXAM: Normal to LT t/o  COORDINATION: Normal finger-to-nose exam.   GAIT: Narrow based and stable.      Imagin/15/18  MRI brain w/wo: limited by motion. Normal imaging of the brain.  There is no acute abnormality. No enhancing lesions. MRI C spine: Minimal degenerative change in the cervical spine. There are no findings to suggest demyelinating disease. MRI T spine: The cord is normal without evidence of signal abnormality or pathologic enhancement to suggest demyelinating disease. There is very mild degenerative change with mild osteophyte formation and/or minimal bulging of the annulus at several levels but there is no significant spinal canal or foraminal stenosis at any level.  There is no cord or nerve root compression.        Labs:     Lab Results   Component Value Date    UYTYMHTC68OW 27 (L) 2018     No results found for: JCVINDEX, JCVANTIBODY  No results found for: FK8STBHI, ABSOLUTECD3, YV1ZFPBK, ABSOLUTECD8, MS7AWFTN, ABSOLUTECD4, LABCD48  Lab Results   Component Value Date    WBC 6.90 2018    RBC 4.35 2018    HGB 12.4 2018    HCT 37.6 2018    MCV 86 2018    MCH 28.6 2018    MCHC 33.1 2018    RDW 13.2 2018     2018    MPV 8.0 (L) 2018    GRAN 4.5 2018    GRAN 65.2 2018    LYMPH 1.6 2018    LYMPH 23.4 2018    MONO 0.4 2018    MONO 5.2 2018    EOS 0.4 2018    BASO 0.00 2018    EOSINOPHIL 5.7 2018    BASOPHIL 0.5 2018       Chemistry        Component Value  Date/Time     05/08/2018 1115    K 3.6 05/08/2018 1115     05/08/2018 1115    CO2 29 05/08/2018 1115    BUN 13 05/08/2018 1115    CREATININE 0.8 05/08/2018 1115    GLU 82 05/08/2018 1115        Component Value Date/Time    CALCIUM 9.2 05/08/2018 1115    ALKPHOS 68 05/08/2018 1115    AST 15 05/08/2018 1115    ALT 14 05/08/2018 1115    BILITOT 0.5 05/08/2018 1115    ESTGFRAFRICA >60 05/08/2018 1115    EGFRNONAA >60 05/08/2018 1115        5/24/18 - normal/unremarkable: ceruloplasmin, copper, spep/karin, lyme ab, RPR, TSH, T4, vit B12 (638), zinc, C3/C4, cardiolipin ab, DRVVT, RF, B2 glycoprotein, ANCA ab, anti-DNA ab, folate, homocysteine, gliadin IGG/IGA    5/8/18 - normal/unremarkable: SIMONE, hep panel, HIV, TSH, SSA, RF, ESR, CCP, CK, CRP, CMP, CBC. Low- Vit D 27      Diagnosis/Assessment/Plan:     Paresthesias, gait instability, fatigue, intermittent LE weakness   Vit D Deficiency   Cervical spine stenosis  · Assessment: no clear etiology for symptoms with current w/u. Not consistent with MS, especially with normal MRI brain and no lesions on spinal cord. Had extensive discussion concerning this. She does have some degenerative disease in cervical spine; discussed that this is also not likely causing all of her issues, but we can refer her to neurosurgery for monitoring and review of disc disease.   · Discussed some general wellness guidelines (used for MS, but clarified that these guidelines pertain to all people) to abide by in meantime. 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  · Imaging: no new imaging at this time  · Offered EMG to further eval paresthesias, but patient would like to hold on EMG at present.  · Medications: no new meds, continue vit D  · Consult: neurosurgery for spine stenosis    RTC in 4 months    Over 50% of this 40 minute visit was spent in direct face to face counseling of the patient about diagnosis, further w/u, and  symptom management.         Cervical stenosis of spine  -     Ambulatory consult to Neurosurgery

## 2018-06-26 ENCOUNTER — PATIENT MESSAGE (OUTPATIENT)
Dept: NEUROLOGY | Facility: CLINIC | Age: 43
End: 2018-06-26

## 2018-06-26 PROBLEM — R29.898 TRANSIENT WEAKNESS OF LEFT LEG: Status: ACTIVE | Noted: 2018-06-26

## 2018-07-03 ENCOUNTER — PATIENT MESSAGE (OUTPATIENT)
Dept: NEUROLOGY | Facility: CLINIC | Age: 43
End: 2018-07-03

## 2018-07-03 NOTE — TELEPHONE ENCOUNTER
Attempted to call patient. No answer. Left brief message concerning nature of call. Will try again.    Called patient second time. No answer. Left brief message.

## 2018-07-05 RX ORDER — SUMATRIPTAN 50 MG/1
TABLET, FILM COATED ORAL
Qty: 10 TABLET | Refills: 1 | Status: SHIPPED | OUTPATIENT
Start: 2018-07-05 | End: 2020-09-24

## 2018-07-09 ENCOUNTER — OFFICE VISIT (OUTPATIENT)
Dept: RHEUMATOLOGY | Facility: CLINIC | Age: 43
End: 2018-07-09
Payer: COMMERCIAL

## 2018-07-09 VITALS
BODY MASS INDEX: 28.19 KG/M2 | HEART RATE: 69 BPM | WEIGHT: 190.31 LBS | DIASTOLIC BLOOD PRESSURE: 76 MMHG | HEIGHT: 69 IN | SYSTOLIC BLOOD PRESSURE: 122 MMHG

## 2018-07-09 DIAGNOSIS — E55.9 VITAMIN D DEFICIENCY: Primary | ICD-10-CM

## 2018-07-09 DIAGNOSIS — R20.2 PARESTHESIAS: ICD-10-CM

## 2018-07-09 DIAGNOSIS — R53.83 OTHER FATIGUE: ICD-10-CM

## 2018-07-09 PROCEDURE — 3008F BODY MASS INDEX DOCD: CPT | Mod: CPTII,S$GLB,, | Performed by: INTERNAL MEDICINE

## 2018-07-09 PROCEDURE — 99999 PR PBB SHADOW E&M-EST. PATIENT-LVL III: CPT | Mod: PBBFAC,,, | Performed by: INTERNAL MEDICINE

## 2018-07-09 PROCEDURE — 99214 OFFICE O/P EST MOD 30 MIN: CPT | Mod: S$GLB,,, | Performed by: INTERNAL MEDICINE

## 2018-07-09 NOTE — PROGRESS NOTES
"Subjective:       Patient ID: Arcelia Moss is a 42 y.o. female.    Chief Complaint: Joint pain     HPI:  Arcelia Moss is a 42 y.o. female with paresthesias in arms and legs.  Saw neurology and work up including MRI.  She still has circular spot on left side of back that becomes numb.  MRI with cervical spine disease so neurology referred to neurosurgery for evaluation.      Review of Systems   Constitutional: Negative for fever and unexpected weight change.   HENT: Negative for trouble swallowing.    Eyes: Negative for redness.   Respiratory: Negative for cough and shortness of breath.    Cardiovascular: Negative for chest pain.   Gastrointestinal: Negative for constipation and diarrhea.   Endocrine: Negative.    Genitourinary: Negative for dysuria and genital sores.   Musculoskeletal:        Feet painful after being on feet 4th of July   Skin: Negative for rash.   Allergic/Immunologic: Negative.    Neurological: Positive for headaches.   Hematological: Does not bruise/bleed easily.   Psychiatric/Behavioral: Negative.          Objective:   /76 (BP Location: Right arm, Patient Position: Sitting, BP Method: Medium (Automatic))   Pulse 69   Ht 5' 9" (1.753 m)   Wt 86.3 kg (190 lb 4.8 oz)   BMI 28.10 kg/m²      Physical Exam   Constitutional: She is oriented to person, place, and time and well-developed, well-nourished, and in no distress.   HENT:   Head: Normocephalic and atraumatic.   Eyes: Conjunctivae and EOM are normal.   Neck: Neck supple.   Cardiovascular: Normal rate, regular rhythm and normal heart sounds.    Pulmonary/Chest: Effort normal and breath sounds normal.   Abdominal: Soft. Bowel sounds are normal.   Neurological: She is alert and oriented to person, place, and time. Gait normal.   Skin: Skin is warm and dry.     Psychiatric: Affect normal.   Musculoskeletal:   28 joint count: 0 swollen and 0 tender          Labs and images reviewed       Assessment:       1.  Joint " pains  2.  Fatigue  3.  Paresthesias  4.  Back pain  5.  Dry eyes and mouth  6.  Weight gain  7.  Dysphagia  8.  Headaches  9.  Double vision  10.  Vitamin D deficiency  Plan:       1.  Continue vitamin D3 1,000 units  2.  Check vitamin D level when she neurosurgery    RTO 6 months/prn

## 2018-07-24 ENCOUNTER — PATIENT MESSAGE (OUTPATIENT)
Dept: OBSTETRICS AND GYNECOLOGY | Facility: CLINIC | Age: 43
End: 2018-07-24

## 2018-07-24 RX ORDER — NITROFURANTOIN 25; 75 MG/1; MG/1
CAPSULE ORAL
Qty: 30 CAPSULE | Refills: 2 | Status: SHIPPED | OUTPATIENT
Start: 2018-07-24 | End: 2018-09-27

## 2018-09-27 ENCOUNTER — OFFICE VISIT (OUTPATIENT)
Dept: OBSTETRICS AND GYNECOLOGY | Facility: CLINIC | Age: 43
End: 2018-09-27
Payer: COMMERCIAL

## 2018-09-27 VITALS — HEIGHT: 69 IN | WEIGHT: 189.63 LBS | BODY MASS INDEX: 28.08 KG/M2

## 2018-09-27 DIAGNOSIS — Z11.51 SCREENING FOR HUMAN PAPILLOMAVIRUS: ICD-10-CM

## 2018-09-27 DIAGNOSIS — Z01.419 ENCOUNTER FOR GYNECOLOGICAL EXAMINATION: ICD-10-CM

## 2018-09-27 DIAGNOSIS — Z12.31 BREAST CANCER SCREENING BY MAMMOGRAM: ICD-10-CM

## 2018-09-27 DIAGNOSIS — Z87.440 HISTORY OF UTI: Primary | ICD-10-CM

## 2018-09-27 DIAGNOSIS — Z12.4 SCREENING FOR MALIGNANT NEOPLASM OF CERVIX: ICD-10-CM

## 2018-09-27 PROCEDURE — 88175 CYTOPATH C/V AUTO FLUID REDO: CPT | Performed by: PATHOLOGY

## 2018-09-27 PROCEDURE — 99396 PREV VISIT EST AGE 40-64: CPT | Mod: S$GLB,,, | Performed by: OBSTETRICS & GYNECOLOGY

## 2018-09-27 PROCEDURE — 87624 HPV HI-RISK TYP POOLED RSLT: CPT

## 2018-09-27 PROCEDURE — 99999 PR PBB SHADOW E&M-EST. PATIENT-LVL III: CPT | Mod: PBBFAC,,, | Performed by: OBSTETRICS & GYNECOLOGY

## 2018-09-27 PROCEDURE — 88141 CYTOPATH C/V INTERPRET: CPT | Mod: ,,, | Performed by: PATHOLOGY

## 2018-09-27 RX ORDER — NITROFURANTOIN (MACROCRYSTALS) 100 MG/1
100 CAPSULE ORAL
Qty: 30 CAPSULE | Refills: 6 | Status: SHIPPED | OUTPATIENT
Start: 2018-09-27 | End: 2020-09-24 | Stop reason: SDUPTHER

## 2018-09-27 NOTE — PROGRESS NOTES
Chief Complaint: well woman exam  Well Woman (Annual Exam, last pap ASCUS & hpv negative, )      Arcelia Moss is a 42 y.o. female  presents for a well woman exam.    She is established.  Patient had been having headaches dizziness and numbness on the left side.  Saw a neurologist as well as a rheumatologist.  Workup to for S was negative and was found to have C3-4 in 5 compression of her spinal nerves.  Will refer to urologist.  No complaints.   Cycles:  Regular and monthly.  LMP: Patient's last menstrual period was 2018..    Contraception: The current method of family planning is vasectomy.      Last pap: 2016 Ascus and hpv neg  Last MMG:  Baseline was normal have not had 1 since      Current Outpatient Medications:     valACYclovir (VALTREX) 1000 MG tablet, Take by mouth continuous prn. , Disp: , Rfl:     vitamin D 1000 units Tab, Take 1,000 Units by mouth once daily., Disp: , Rfl:     meclizine (ANTIVERT) 25 mg tablet, Take by mouth 2 (two) times daily as needed. , Disp: , Rfl:     nitrofurantoin (MACRODANTIN) 100 MG capsule, Take 1 capsule (100 mg total) by mouth as needed (intercourse)., Disp: 30 capsule, Rfl: 6    sumatriptan (IMITREX) 50 MG tablet, Take 1 tablet at headache onset. Can repeat once if headache not fully resolved. No more than 2 tabs in 24hrs, Disp: 10 tablet, Rfl: 1    Past Medical History:   Diagnosis Date    Meniere disease     Pregnancy induced    Polycystic ovarian syndrome        Past Surgical History:   Procedure Laterality Date     SECTION      DILATION AND CURETTAGE OF UTERUS      TUBAL LIGATION      then reversal due to pain     tubal reversal      Due to pain after tubal ligation       OB History    Para Term  AB Living   2         3   SAB TAB Ectopic Multiple Live Births         1 3      # Outcome Date GA Lbr Perry/2nd Weight Sex Delivery Anes PTL Lv   2  07    F CS-LTranv   [place]   1A  06    M  "CS-LTranv Spinal  [place]      Complications: Hypertension complicating pregnancy in third trimester   1B  06    F CS-LTranv   S[place]TON          Family History   Problem Relation Age of Onset    Osteoarthritis Mother     Meniere's disease Paternal Aunt     Kidney disease Maternal Grandmother     Diabetes Mellitus Maternal Grandmother     Hyperlipidemia Maternal Grandmother     Hypertension Maternal Grandmother     Ovarian cancer Neg Hx     Colon cancer Neg Hx     Breast cancer Neg Hx     Glaucoma Neg Hx     Inflammatory bowel disease Neg Hx     Lupus Neg Hx     Psoriasis Neg Hx     Thyroid disease Neg Hx        Social History     Tobacco Use    Smoking status: Never Smoker    Smokeless tobacco: Never Used    Tobacco comment: ; counselor   Substance Use Topics    Alcohol use: No    Drug use: No         ROS:  GENERAL: Denies weight gain or weight loss. Feeling well overall.   SKIN: Denies rash or lesions.   HEENT: Denies headaches, or vision changes.   CARDIOVASCULAR: Denies palpitations or left sided chest pain.   RESPIRATORY: Denies shortness of breath or dyspnea on exertion.  BREASTS: Denies pain, lumps, or nipple discharge.   ABDOMEN: Denies abdominal pain, constipation, diarrhea, nausea, vomiting, change in appetite or rectal bleeding.   URINARY: Denies frequency, dysuria, hematuria.  NEUROLOGIC: Denies syncope or weakness.   PSYCHIATRIC: Denies depression, anxiety or mood swings.    Physical Exam:  Ht 5' 9" (1.753 m)   Wt 86 kg (189 lb 9.5 oz)   LMP 2018   BMI 28.00 kg/m²     APPEARANCE: Well nourished, well developed, in no acute distress.  AFFECT: WNL, alert and oriented x 3  SKIN: No acne or hirsutism  BREASTS: Symmetrical, no skin changes.                     No nipple discharge. No palpable masses bilaterally  ABDOMEN: soft Non tender Non distended No masses  PELVIC:   Normal external genitalia without lesions.  Normal hair distribution.   Adequate perineal body, " normal urethral meatus. No signif cystocele or rectocele.  Vagina moist and well rugated without lesions or discharge.    Cervix pink, without lesions, discharge or tenderness.     PAP performed   Bimanual exam shows uterus to be normal size, regular, mobile and nontender.  Adnexa without masses or tenderness.    EXTREMITIES: No edema.    ASSESSMENT AND PLAN    Arcelia was seen today for well woman.    Diagnoses and all orders for this visit:    History of UTI  -     nitrofurantoin (MACRODANTIN) 100 MG capsule; Take 1 capsule (100 mg total) by mouth as needed (intercourse).    Encounter for gynecological examination    Breast cancer screening by mammogram  -     Mammo Digital Screening Bilat with Tomosynthesis CAD; Future    Screening for malignant neoplasm of cervix  -     Liquid-based pap smear, screening    Screening for human papillomavirus  -     HPV High Risk Genotypes, PCR          1. History of UTI  nitrofurantoin (MACRODANTIN) 100 MG capsule   2. Encounter for gynecological examination     3. Breast cancer screening by mammogram  Mammo Digital Screening Bilat with Tomosynthesis CAD   4. Screening for malignant neoplasm of cervix  Liquid-based pap smear, screening   5. Screening for human papillomavirus  HPV High Risk Genotypes, PCR       Follow-up in about 1 year (around 9/27/2019).    Patient was counseled today on A.C.S. Pap guidelines and recommendations for yearly pelvic exams, mammograms and monthly self breast exams; to see her PCP for other health maintenance.     Patient encouraged to register for portal and results will be sent via portal.       Health Maintenance   Topic Date Due    TETANUS VACCINE  12/31/1994    Mammogram  12/31/2016    Influenza Vaccine  08/01/2018    Pap Smear with HPV Cotest  04/08/2019    Lipid Panel  Completed

## 2018-10-05 LAB
HPV HR 12 DNA CVX QL NAA+PROBE: NEGATIVE
HPV16 AG SPEC QL: NEGATIVE
HPV18 DNA SPEC QL NAA+PROBE: NEGATIVE

## 2018-10-08 NOTE — PROGRESS NOTES
Alvarez Paniagua,    I have received the results of your pap and your HPV test.  Your Pap smear has returned with atypical cells of undetermined significance, but your HPV test is NEGATIVE/normal..  This means that you do NOT have the virus that can cause cervical cancer nor abnormal cells on your cervix.  Therefore this is considered a NORMAL Pap smear.  I recommend screening again in 1 year.  If you have any questions don't hesitate to ask, but I am sure you this is a completely normal pap and we will just repeat it in 1 year.    Take care,  Dr Moore

## 2019-01-21 ENCOUNTER — CLINICAL SUPPORT (OUTPATIENT)
Dept: URGENT CARE | Facility: CLINIC | Age: 44
End: 2019-01-21
Payer: COMMERCIAL

## 2019-01-21 VITALS
OXYGEN SATURATION: 98 % | HEART RATE: 79 BPM | HEIGHT: 68 IN | WEIGHT: 195 LBS | TEMPERATURE: 99 F | DIASTOLIC BLOOD PRESSURE: 82 MMHG | RESPIRATION RATE: 14 BRPM | BODY MASS INDEX: 29.55 KG/M2 | SYSTOLIC BLOOD PRESSURE: 127 MMHG

## 2019-01-21 DIAGNOSIS — J40 BRONCHITIS: Primary | ICD-10-CM

## 2019-01-21 DIAGNOSIS — J01.90 ACUTE NON-RECURRENT SINUSITIS, UNSPECIFIED LOCATION: ICD-10-CM

## 2019-01-21 PROCEDURE — 96372 PR INJECTION,THERAP/PROPH/DIAG2ST, IM OR SUBCUT: ICD-10-PCS | Mod: S$GLB,,, | Performed by: NURSE PRACTITIONER

## 2019-01-21 PROCEDURE — 99204 OFFICE O/P NEW MOD 45 MIN: CPT | Mod: 25,S$GLB,, | Performed by: NURSE PRACTITIONER

## 2019-01-21 PROCEDURE — 99204 PR OFFICE/OUTPT VISIT, NEW, LEVL IV, 45-59 MIN: ICD-10-PCS | Mod: 25,S$GLB,, | Performed by: NURSE PRACTITIONER

## 2019-01-21 PROCEDURE — 96372 THER/PROPH/DIAG INJ SC/IM: CPT | Mod: S$GLB,,, | Performed by: NURSE PRACTITIONER

## 2019-01-21 RX ORDER — PROMETHAZINE HYDROCHLORIDE AND DEXTROMETHORPHAN HYDROBROMIDE 6.25; 15 MG/5ML; MG/5ML
5 SYRUP ORAL EVERY 4 HOURS PRN
Qty: 240 ML | Refills: 0 | Status: SHIPPED | OUTPATIENT
Start: 2019-01-21 | End: 2019-01-31

## 2019-01-21 RX ORDER — ALBUTEROL SULFATE 90 UG/1
2 AEROSOL, METERED RESPIRATORY (INHALATION) EVERY 6 HOURS PRN
Qty: 18 G | Refills: 0 | Status: SHIPPED | OUTPATIENT
Start: 2019-01-21 | End: 2020-09-24

## 2019-01-21 RX ORDER — PREDNISONE 20 MG/1
20 TABLET ORAL 2 TIMES DAILY
Qty: 10 TABLET | Refills: 0 | Status: SHIPPED | OUTPATIENT
Start: 2019-01-21 | End: 2019-01-26

## 2019-01-21 RX ORDER — FLUTICASONE PROPIONATE 50 MCG
1 SPRAY, SUSPENSION (ML) NASAL DAILY
COMMUNITY
End: 2020-09-24

## 2019-01-21 RX ORDER — FLUTICASONE PROPIONATE 50 MCG
2 SPRAY, SUSPENSION (ML) NASAL 2 TIMES DAILY
Qty: 1 BOTTLE | Refills: 0 | Status: SHIPPED | OUTPATIENT
Start: 2019-01-21 | End: 2020-09-24

## 2019-01-21 RX ORDER — DOXYLAMINE SUCCINATE AND PHENYLEPHRINE HYDROCHLORIDE 7.5; 1 MG/1; MG/1
1 TABLET ORAL EVERY 4 HOURS PRN
Qty: 30 TABLET | Refills: 0 | COMMUNITY
Start: 2019-01-21 | End: 2020-09-24

## 2019-01-21 RX ORDER — DOXYCYCLINE 100 MG/1
100 CAPSULE ORAL 2 TIMES DAILY
Qty: 20 CAPSULE | Refills: 0 | Status: SHIPPED | OUTPATIENT
Start: 2019-01-21 | End: 2019-01-31

## 2019-01-21 RX ORDER — DEXAMETHASONE SODIUM PHOSPHATE 4 MG/ML
4 INJECTION, SOLUTION INTRA-ARTICULAR; INTRALESIONAL; INTRAMUSCULAR; INTRAVENOUS; SOFT TISSUE
Status: COMPLETED | OUTPATIENT
Start: 2019-01-21 | End: 2019-01-21

## 2019-01-21 RX ADMIN — DEXAMETHASONE SODIUM PHOSPHATE 4 MG: 4 INJECTION, SOLUTION INTRA-ARTICULAR; INTRALESIONAL; INTRAMUSCULAR; INTRAVENOUS; SOFT TISSUE at 10:01

## 2019-01-21 NOTE — PATIENT INSTRUCTIONS
Symptomatic treatment:    Alternate Tylenol and Ibuprofen every 3 hrs  salt water gargles to soothe throat  Honey/lemon water to soothe throat  Cold-eeze helps to reduce the duration of URI symptoms  Elderberry to reduce duration of URI symptoms  Cepachol helps to numb the discomfort in throat  Nasal saline spray reduces inflammation and dryness  Warm face compresses/hot showers as often as you can to open sinuses   Vicks vapor rub at night  Flonase OTC or Nasacort OTC  Simple foods like chicken noodle soup help hydrate  Delsym helps with coughing at night  Zyrtec/Claritin during the day and Benadryl at night may help if allergy component   Zantac will help if there is reflux from the post nasal drip  Rest as much as you can  Your symptoms will likely last 5-7 days, maybe longer depending on how it affects your body.  You are contagious 5-7, so minimize contact with others to reduce the spread to others and stay home from work or school as we discussed. Dehydration is preventable but is one of the main reasons why you will feel so badly. Drink pedialyte, gatorade or propel. Stay hydrated.  Antibiotics are not needed unless a complication(such as Otitis Media, Bacterial sinus infection or pneumonia). Taking antibiotics for Flu/Cold is not supported by evidencebased medicine and can expose you to unnecessary side effects of the medication, such as anaphylaxis.   If you experience any:  Chest pain, shortness of breath, wheezing or difficulty breathing  Severe headache, face, neck or ear pain  New rash  Fever over 101.5º F (38.6 C) for more than three days  Confusion, behavior change or seizure  Severe weakness or dizziness  Go to ER        What Is Acute Bronchitis?  Acute bronchitis is when the airways in your lungs (bronchial tubes) become red and swollen (inflamed). It is usually caused by a viral infection. But it can also occur because of a bacteria or allergen. Symptoms include a cough that produces yellow or  greenish mucus and can last for days or sometimes weeks.  Inside healthy lungs    Air travels in and out of the lungs through the airways. The linings of these airways produce sticky mucus. This mucus traps particles that enter the lungs. Tiny structures called cilia then sweep the particles out of the airways.     Healthy airway: Airways are normally open. Air moves in and out easily.      Healthy cilia: Tiny, hairlike cilia sweep mucus and particles up and out of the airways.   Lungs with bronchitis  Bronchitis often occurs with a cold or the flu virus. The airways become inflamed (red and swollen). There is a deep hacking cough from the extra mucus. Other symptoms may include:  · Wheezing  · Chest discomfort  · Shortness of breath  · Mild fever  A second infection, this time due to bacteria, may then occur. And airways irritated by allergens or smoke are more likely to get infected.        Inflamed airway: Inflammation and extra mucus narrow the airway, causing shortness of breath.      Impaired cilia: Extra mucus impairs cilia, causing congestion and wheezing. Smoking makes the problem worse.   Making a diagnosis  A physical exam, health history, and certain tests help your healthcare provider make the diagnosis.  Health history  Your healthcare provider will ask you about your symptoms.  The exam  Your provider listens to your chest for signs of congestion. He or she may also check your ears, nose, and throat.  Possible tests  · A sputum test for bacteria. This requires a sample of mucus from your lungs.  · A nasal or throat swab. This tests to see if you have a bacterial infection.  · A chest X-ray. This is done if your healthcare provider thinks you have pneumonia.  · Tests to check for an underlying condition. Other tests may be done to check for things such as allergies, asthma, or COPD (chronic obstructive pulmonary disease). You may need to see a specialist for more lung function testing.  Treating a  cough  The main treatment for bronchitis is easing symptoms. Avoiding smoke, allergens, and other things that trigger coughing can often help. If the infection is bacterial, you may be given antibiotics. During the illness, it's important to get plenty of sleep. To ease symptoms:  · Dont smoke. Also avoid secondhand smoke.  · Use a humidifier. Or try breathing in steam from a hot shower. This may help loosen mucus.  · Drink a lot of water and juice. They can soothe the throat and may help thin mucus.  · Sit up or use extra pillows when in bed. This helps to lessen coughing and congestion.  · Ask your provider about using medicine. Ask about using cough medicine, pain and fever medicine, or a decongestant.  Antibiotics  Most cases of bronchitis are caused by cold or flu viruses. They dont need antibiotics to treat them, even if your mucus is thick and green or yellow. Antibiotics dont treat viral illness and antibiotics have not been shown to have any benefit in cases of acute bronchitis. Taking antibiotics when they are not needed increases your risk of getting an infection later that is antibiotic-resistant. Antibiotics can also cause severe cases of diarrhea that require other antibiotics to treat.  It is important that you accept your healthcare provider's opinion to not use antibiotics. Your provider will prescribe antibiotics if the infection is caused by bacteria. If they are prescribed:  · Take all of the medicine. Take the medicine until it is used up, even if symptoms have improved. If you dont, the bronchitis may come back.  · Take the medicines as directed. For instance, some medicines should be taken with food.  · Ask about side effects. Ask your provider or pharmacist what side effects are common, and what to do about them.  Follow-up care  You should see your provider again in 2 to 3 weeks. By this time, symptoms should have improved. An infection that lasts longer may mean you have a more serious  problem.  Prevention  · Avoid tobacco smoke. If you smoke, quit. Stay away from smoky places. Ask friends and family not to smoke around you, or in your home or car.  · Get checked for allergies.  · Ask your provider about getting a yearly flu shot. Also ask about pneumococcal or pneumonia shots.  · Wash your hands often. This helps reduce the chance of picking up viruses that cause colds and flu.  Call your healthcare provider if:  · Symptoms worsen, or you have new symptoms  · Breathing problems worsen or  become severe  · Symptoms dont get better within a week, or within 3 days of taking antibiotics   Date Last Reviewed: 2/1/2017 © 2000-2017 [employer]. [address] [place] [address], [place] [address]. All rights reserved. This information is not intended as a substitute for professional medical care. Always follow your healthcare professional's instructions.        Acute Sinusitis    Acute sinusitis is irritation and swelling of the sinuses. It is usually caused by a viral infection after a common cold. Your doctor can help you find relief.  What is acute sinusitis?  Sinuses are air-filled spaces in the skull behind the face. They are kept moist and clean by a lining of mucosa. Things such as pollen, smoke, and chemical fumes can irritate the mucosa. It can then swell up. As a response to irritation, the mucosa makes more mucus and other fluids. Tiny hairlike cilia cover the mucosa. Cilia help carry mucus toward the opening of the sinus. Too much mucus may cause the cilia to stop working. This blocks the sinus opening. A buildup of fluid in the sinuses then causes pain and pressure. It can also encourage bacteria to grow in the sinuses.  Common symptoms of acute sinusitis  You may have:  · Facial soreness pain  · Headache  · Fever  · Fluid draining in the back of the throat (postnasal drip)  · Congestion  · Drainage that is thick and colored, instead of clear  · Cough  Diagnosing acute  sinusitis  Your doctor will ask about your symptoms and health history. He or she will look at your ear, nose, and throat. You usually won't need to have X-rays taken.    The doctor may take a sample of mucus to check for bacteria. If you have sinusitis that keeps coming back, you may need imaging tests such as X-rays or CAT scans. This will help your doctor check for a structural problem that may be causing the infection.  Treating acute sinusitis  Treatment is aimed at unblocking the sinus opening and helping the cilia work again. You may need to take antihistamine and decongestant medicine. These can reduce inflammation and decrease the amount of fluid your sinuses make. If you have a bacterial infection, you will need to take antibiotic medicine for 10 to 14 days. Take this medicine until it is gone, even if you feel better.  Date Last Reviewed: 10/1/2016  © 2444-0398 [employer]. [address] [place] [address]. All rights reserved. This information is not intended as a substitute for professional medical care. Always follow your healthcare professional's instructions.        Preventing Sinusitis    Colds, flu, and allergies make it more likely for you to get sinusitis. Do your best to prevent sinusitis by preventing these problems. Do what you can to avoid getting colds and other infections. Stay away from things that cause allergies (allergens). Keep your sinuses as moist as you can.  Tips for air travel  When traveling on an airplane, use saline nasal spray to keep your sinuses moist. Drink plenty of fluids. You may also want to take a decongestant before you get on the plane.   Prevent colds  Do what you can to avoid being exposed to colds and flu. When possible, take more time to rest when you feel something coming on.  · Wash your hands often. This is especially important during cold and flu season. Try not to touch your face.  · As much as possible, stay away from infected  people.  · Follow these standbys for staying healthy: Eat balanced meals, exercise regularly, and get plenty of sleep.  Stay away from allergens  First find out what things youre allergic to. Then take steps to stay away from allergens or irritants in the air such as dust, pollution, and pollen.  · Wear a mask when you clean. Or consider hiring a  to help you stay away from dust.  · Sit in the nonsmoking sections of restaurants.  · Don't go outdoors during peak pollution hours such as rush hour.  · Keep an air conditioner on during allergy season. Clean its filter regularly.  · Ask your healthcare provider about a referral to have an allergy evaluation. Or ask for a referral to see an allergy specialist.  Boost moisture  Keeping your sinuses moist makes your mucus thinner. This allows your sinuses to drain better. And this helps prevent infection. Ask your doctor about these suggestions:  · Use a humidifier. Clean it often to remove any mold or mildew.  · Drink several glasses of water a day.  · Stay away from drying beverages such as alcohol and coffee.  · Stay away from all types of smoke, which dries out sinus linings. This includes tobacco smoke and chemical smoke in workplace settings.  · Use saltwater rinses.  Date Last Reviewed: 10/1/2016  © 4431-5294 [employer]. [address] [place] [address], [place] [address]. All rights reserved. This information is not intended as a substitute for professional medical care. Always follow your healthcare professional's instructions.        Self-Care for Sinusitis     Drinking plenty of water can help sinuses drain.   Sinusitis can often be managed with self-care. Self-care can keep sinuses moist and make you feel more comfortable. Remember to follow your doctor's instructions closely. This can make a big difference in getting your sinus problem under control.  Drink fluids  Drinking extra fluids helps thin your mucus. This lets it drain from your  sinuses more easily. Have a glass of water every hour or two. A humidifier helps in much the same way. Fluids can also offset the drying effects of certain medicines. If you use a humidifier, follow the product maker's instructions on how to use it. Clean it on a regular schedule.  Use saltwater rinses  Rinses help keep your sinuses and nose moist. Mix a teaspoon of salt in 8 ounces of fresh, warm water. Use a bulb syringe to gently squirt the water into your nose a few times a day. You can also buy ready-made saline nasal sprays.  Apply hot or cold packs  Applying heat to the area surrounding your sinuses may make you feel more comfortable. Use a hot water bottle or a hand towel dipped in hot water. Some people also find ice packs effective for relieving pain.  Medicines  Your doctor may prescribe medications to help treat your sinusitis. If you have an infection, antibiotics can help clear it up. If you are prescribed antibiotics, take all pills on schedule until they are gone, even if you feel better. Decongestants help relieve swelling. Use decongestant sprays for short periods only under the direction of your doctor. If you have allergies, your doctor may prescribe medications to help relieve them.   Date Last Reviewed: 10/1/2016  © 4531-9911 [employer]. [address] [place] [address], [place] [address]. All rights reserved. This information is not intended as a substitute for professional medical care. Always follow your healthcare professional's instructions.

## 2019-01-21 NOTE — PROGRESS NOTES
"Subjective:       Patient ID: Arcelia Moss is a 43 y.o. female.    Vitals:  height is 5' 8" (1.727 m) and weight is 88.5 kg (195 lb). Her temperature is 98.6 °F (37 °C). Her blood pressure is 127/82 and her pulse is 79. Her respiration is 14 and oxygen saturation is 98%.     Chief Complaint: Sinus Problem    Sinus Problem   The current episode started in the past 7 days. There has been no fever. Associated symptoms include congestion, coughing, headaches, sinus pressure and a sore throat. Pertinent negatives include no chills or shortness of breath. (Scratcy throat , sinus drip ) Treatments tried: delsyum, robitussin, aleeve D, sudafed , advil sinus and cold   The treatment provided mild relief.       Constitution: Positive for fatigue. Negative for chills and fever.   HENT: Positive for congestion, postnasal drip, sinus pain, sinus pressure and sore throat.    Neck: Negative for painful lymph nodes.   Cardiovascular: Negative for chest pain and leg swelling.   Eyes: Negative for double vision and blurred vision.   Respiratory: Positive for cough. Negative for shortness of breath.    Gastrointestinal: Negative for nausea, vomiting and diarrhea.   Genitourinary: Negative for dysuria, frequency, urgency and history of kidney stones.   Musculoskeletal: Negative for joint pain, joint swelling, muscle cramps and muscle ache.   Skin: Negative for color change, pale, rash and bruising.   Allergic/Immunologic: Negative for seasonal allergies.   Neurological: Positive for headaches. Negative for dizziness, history of vertigo, light-headedness and passing out.   Hematologic/Lymphatic: Negative for swollen lymph nodes.   Psychiatric/Behavioral: Negative for nervous/anxious, sleep disturbance and depression. The patient is not nervous/anxious.        Objective:      Physical Exam   Constitutional: She is oriented to person, place, and time. Vital signs are normal. She appears well-developed and well-nourished. She is " cooperative.   HENT:   Head: Normocephalic.   Right Ear: Hearing, external ear and ear canal normal. A middle ear effusion is present.   Left Ear: Hearing, external ear and ear canal normal. A middle ear effusion is present.   Nose: Mucosal edema and rhinorrhea present. Right sinus exhibits maxillary sinus tenderness. Left sinus exhibits maxillary sinus tenderness.   Mouth/Throat: Uvula is midline and mucous membranes are normal. Posterior oropharyngeal erythema present.   Eyes: Conjunctivae, EOM and lids are normal. Pupils are equal, round, and reactive to light.   Neck: Trachea normal, normal range of motion, full passive range of motion without pain and phonation normal. Neck supple.   Cardiovascular: Normal rate, regular rhythm, normal heart sounds, intact distal pulses and normal pulses.   Pulmonary/Chest: Effort normal and breath sounds normal.   Abdominal: Soft. Normal appearance, normal aorta and bowel sounds are normal. There is no tenderness.   Musculoskeletal: Normal range of motion.   Neurological: She is alert and oriented to person, place, and time. She has normal strength. GCS eye subscore is 4. GCS verbal subscore is 5. GCS motor subscore is 6.   Skin: Skin is warm, dry and intact. Capillary refill takes less than 2 seconds.   Psychiatric: She has a normal mood and affect. Her speech is normal and behavior is normal. Judgment and thought content normal. Cognition and memory are normal.       Assessment:       1. Bronchitis    2. Acute non-recurrent sinusitis, unspecified location        Plan:         Bronchitis    Acute non-recurrent sinusitis, unspecified location    Other orders  -     dexamethasone injection 4 mg  -     albuterol (VENTOLIN HFA) 90 mcg/actuation inhaler; Inhale 2 puffs into the lungs every 6 (six) hours as needed for Wheezing. Rescue  Dispense: 18 g; Refill: 0  -     doxycycline (MONODOX) 100 MG capsule; Take 1 capsule (100 mg total) by mouth 2 (two) times daily. for 10 days   Dispense: 20 capsule; Refill: 0  -     fluticasone (FLONASE) 50 mcg/actuation nasal spray; 2 sprays (100 mcg total) by Each Nare route 2 (two) times daily.  Dispense: 1 Bottle; Refill: 0  -     predniSONE (DELTASONE) 20 MG tablet; Take 1 tablet (20 mg total) by mouth 2 (two) times daily. for 5 days  Dispense: 10 tablet; Refill: 0  -     promethazine-dextromethorphan (PROMETHAZINE-DM) 6.25-15 mg/5 mL Syrp; Take 5 mLs by mouth every 4 (four) hours as needed.  Dispense: 240 mL; Refill: 0  -     doxylamine-phenylephrine (POLY HIST FORTE, DOXYLAMINE,) 7.5-10 mg Tab; Take 1 tablet by mouth every 4 (four) hours as needed.  Dispense: 30 tablet; Refill: 0

## 2019-06-21 ENCOUNTER — HOSPITAL ENCOUNTER (OUTPATIENT)
Dept: RADIOLOGY | Facility: HOSPITAL | Age: 44
Discharge: HOME | End: 2019-06-21
Attending: NURSE PRACTITIONER
Payer: COMMERCIAL

## 2019-06-21 ENCOUNTER — OFFICE VISIT (OUTPATIENT)
Dept: ORTHOPEDICS | Facility: CLINIC | Age: 44
End: 2019-06-21
Payer: COMMERCIAL

## 2019-06-21 ENCOUNTER — TELEPHONE (OUTPATIENT)
Dept: ORTHOPEDICS | Facility: CLINIC | Age: 44
End: 2019-06-21

## 2019-06-21 VITALS
WEIGHT: 192.25 LBS | HEART RATE: 77 BPM | BODY MASS INDEX: 29.23 KG/M2 | SYSTOLIC BLOOD PRESSURE: 113 MMHG | DIASTOLIC BLOOD PRESSURE: 65 MMHG

## 2019-06-21 DIAGNOSIS — R52 PAIN: ICD-10-CM

## 2019-06-21 DIAGNOSIS — M79.671 PAIN OF RIGHT HEEL: Primary | ICD-10-CM

## 2019-06-21 PROCEDURE — 99203 OFFICE O/P NEW LOW 30 MIN: CPT | Mod: S$GLB,,, | Performed by: NURSE PRACTITIONER

## 2019-06-21 PROCEDURE — 3008F PR BODY MASS INDEX (BMI) DOCUMENTED: ICD-10-PCS | Mod: CPTII,S$GLB,, | Performed by: NURSE PRACTITIONER

## 2019-06-21 PROCEDURE — 3008F BODY MASS INDEX DOCD: CPT | Mod: CPTII,S$GLB,, | Performed by: NURSE PRACTITIONER

## 2019-06-21 PROCEDURE — 73650 X-RAY EXAM OF HEEL: CPT | Mod: 26,RT,, | Performed by: RADIOLOGY

## 2019-06-21 PROCEDURE — 99203 PR OFFICE/OUTPT VISIT, NEW, LEVL III, 30-44 MIN: ICD-10-PCS | Mod: S$GLB,,, | Performed by: NURSE PRACTITIONER

## 2019-06-21 PROCEDURE — 99999 PR PBB SHADOW E&M-EST. PATIENT-LVL III: CPT | Mod: PBBFAC,,, | Performed by: NURSE PRACTITIONER

## 2019-06-21 PROCEDURE — 99999 PR PBB SHADOW E&M-EST. PATIENT-LVL III: ICD-10-PCS | Mod: PBBFAC,,, | Performed by: NURSE PRACTITIONER

## 2019-06-21 PROCEDURE — 73650 XR CALCANEUS 2 VIEW RIGHT: ICD-10-PCS | Mod: 26,RT,, | Performed by: RADIOLOGY

## 2019-06-21 PROCEDURE — 73650 X-RAY EXAM OF HEEL: CPT | Mod: TC,RT

## 2019-06-21 NOTE — TELEPHONE ENCOUNTER
Notified pt. Pt is schedule with MADHAVI Gonzalez NP 6/21/19 at 2:30 pm. Pt will come at 9:30 am. Patient states verbal understanding and has no further questions.

## 2019-06-21 NOTE — PROGRESS NOTES
SUBJECTIVE:     Chief Complaint & History of Present Illness:  Arcelia Moss is a New 43 y.o. year old female patient here for constant right foot pain which has been present for 2-3 weeks.  There is not a history of injury.  The pain is located in the heel aspect of the foot.  The pain is described as dull, 8/10.  It is aggravated by walking.  There is not radiation, numbness or tingling into the toes.  Associated symptoms include pain posterior heel. Previous treatments include OTC analgesics Naproxen which have provided adequate relief.  There is not a history of previous injury or surgery to the foot.   The patient does not use an assistive device.    Review of patient's allergies indicates:   Allergen Reactions    Vicodin [hydrocodone-acetaminophen] Nausea Only and Nausea And Vomiting         Current Outpatient Medications   Medication Sig Dispense Refill    albuterol (VENTOLIN HFA) 90 mcg/actuation inhaler Inhale 2 puffs into the lungs every 6 (six) hours as needed for Wheezing. Rescue 18 g 0    doxylamine-phenylephrine (POLY HIST FORTE, DOXYLAMINE,) 7.5-10 mg Tab Take 1 tablet by mouth every 4 (four) hours as needed. 30 tablet 0    fluticasone (FLONASE) 50 mcg/actuation nasal spray 1 spray by Each Nare route once daily.      fluticasone (FLONASE) 50 mcg/actuation nasal spray 2 sprays (100 mcg total) by Each Nare route 2 (two) times daily. 1 Bottle 0    meclizine (ANTIVERT) 25 mg tablet Take by mouth 2 (two) times daily as needed.       nitrofurantoin (MACRODANTIN) 100 MG capsule Take 1 capsule (100 mg total) by mouth as needed (intercourse). 30 capsule 6    sumatriptan (IMITREX) 50 MG tablet Take 1 tablet at headache onset. Can repeat once if headache not fully resolved. No more than 2 tabs in 24hrs 10 tablet 1    valACYclovir (VALTREX) 1000 MG tablet Take by mouth continuous prn.       vitamin D 1000 units Tab Take 1,000 Units by mouth once daily.       No current facility-administered  medications for this visit.        Past Medical History:   Diagnosis Date    Meniere disease     Pregnancy induced    Polycystic ovarian syndrome        Past Surgical History:   Procedure Laterality Date     SECTION      DILATION AND CURETTAGE OF UTERUS      TUBAL LIGATION      then reversal due to pain     tubal reversal  2009    Due to pain after tubal ligation       Family History   Problem Relation Age of Onset    Osteoarthritis Mother     Meniere's disease Paternal Aunt     Kidney disease Maternal Grandmother     Diabetes Mellitus Maternal Grandmother     Hyperlipidemia Maternal Grandmother     Hypertension Maternal Grandmother     Ovarian cancer Neg Hx     Colon cancer Neg Hx     Breast cancer Neg Hx     Glaucoma Neg Hx     Inflammatory bowel disease Neg Hx     Lupus Neg Hx     Psoriasis Neg Hx     Thyroid disease Neg Hx          Review of Systems:  ROS:  Constitutional: no fever or chills  Eyes: no visual changes  ENT: no nasal congestion or sore throat  Respiratory: no cough or shortness of breath  Cardiovascular: no chest pain or palpitations  Gastrointestinal: no nausea or vomiting, tolerating diet  Genitourinary: no hematuria or dysuria  Integument/Breast: no rash or pruritis  Hematologic/Lymphatic: no easy bruising or lymphadenopathy  Musculoskeletal: right heel pain  Neurological: no seizures or tremors  Behavioral/Psych: no auditory or visual hallucinations  Endocrine: no heat or cold intolerance      OBJECTIVE:     PHYSICAL EXAM:  Vital Signs (Most Recent)  There were no vitals filed for this visit.     ,   General Appearance: Well nourished, well developed, in no acute distress.  HENT: Normal cephalic, oropharynx pink and moist  Eyes: PERRLA bilaterally and EOM x 4  Respiratory: Even and unlabored  Skin: Warm and Dry.   Psychiatric: AAO x 4, Mood & affect are normal.    right  Foot/Ankle    General appearance: no acute distress, alert/oriented x3, appropriate mood and  affect, looks stated age, slender and well nourished  The examination was performed out of splint/cast  Skin: normal  Swelling: none  Warmth: no warmth  Tenderness: none  ROM: normal  Strength: normal  Gait: normal  Stability: stable to testing and Cotton test: negative  Crepitus: no  Neurological Exam: normal  Vascular Exam: normal and pulse present  Child Test: negative    normal exam, no swelling, tenderness, instability; ligaments intact, full range of motion of all ankle/foot joints      RADIOGRAPHS:  X-ray of right calcaneous obtained today, personally reviewed by me shows no fracture or deformity.  She has a small heel spur noted.    ASSESSMENT/PLAN:       ICD-10-CM ICD-9-CM   1. Pain of right heel M79.671 729.5       Plan:  -Patient presents to clinic with 2-3 week history of right heel pain.  States pain has started to ease up and has been using Naproxen over last few days.  -X-ray negative for fracture or dislocation.  -Likely tendonitis, achilles appears intact on exam.  -Recommend heel padding and shoe inserts as she also has flat feet.  -Recommend ice and elevate PRN.  -Recommend stretching exercises prior to getting out of bed for her flat feet.  -Tylenol and Naproxen PRN for pain.  -She will call if pain not improved in next 2-3 weeks, at which time will consider MRI.

## 2019-06-21 NOTE — TELEPHONE ENCOUNTER
Left message for patient to return call.  Regarding appointment 6/21/19; need to know more about C/O. RArmin Duong PA-C is a trauma PA.

## 2019-06-21 NOTE — TELEPHONE ENCOUNTER
----- Message from Kaye Guerrero sent at 6/21/2019  8:07 AM CDT -----  Contact: Self  Pt returning call to clinic, please call pt back @ [phone]

## 2019-09-20 ENCOUNTER — APPOINTMENT (OUTPATIENT)
Dept: RADIOLOGY | Facility: OTHER | Age: 44
End: 2019-09-20
Attending: OBSTETRICS & GYNECOLOGY
Payer: COMMERCIAL

## 2019-09-20 VITALS — BODY MASS INDEX: 29.1 KG/M2 | HEIGHT: 68 IN | WEIGHT: 192 LBS

## 2019-09-20 DIAGNOSIS — Z12.31 BREAST CANCER SCREENING BY MAMMOGRAM: ICD-10-CM

## 2019-09-20 PROCEDURE — 77063 MAMMO DIGITAL SCREENING BILAT WITH TOMOSYNTHESIS_CAD: ICD-10-PCS | Mod: 26,,, | Performed by: INTERNAL MEDICINE

## 2019-09-20 PROCEDURE — 77063 BREAST TOMOSYNTHESIS BI: CPT | Mod: 26,,, | Performed by: INTERNAL MEDICINE

## 2019-09-20 PROCEDURE — 77067 MAMMO DIGITAL SCREENING BILAT WITH TOMOSYNTHESIS_CAD: ICD-10-PCS | Mod: 26,,, | Performed by: INTERNAL MEDICINE

## 2019-09-20 PROCEDURE — 77067 SCR MAMMO BI INCL CAD: CPT | Mod: TC,PN

## 2019-09-20 PROCEDURE — 77067 SCR MAMMO BI INCL CAD: CPT | Mod: 26,,, | Performed by: INTERNAL MEDICINE

## 2020-09-23 ENCOUNTER — TELEPHONE (OUTPATIENT)
Dept: OBSTETRICS AND GYNECOLOGY | Facility: CLINIC | Age: 45
End: 2020-09-23

## 2020-09-23 DIAGNOSIS — Z12.31 BREAST CANCER SCREENING BY MAMMOGRAM: Primary | ICD-10-CM

## 2020-09-23 NOTE — TELEPHONE ENCOUNTER
Tatyana Connors routed conversation to Oscar SLAUGHTER Staff 46 minutes ago (1:32 PM)      Arcelia Moss [phone]  Tatyana Connors 46 minutes ago (1:31 PM)     Please link [facility] order to appt tomorrow. Thanks.

## 2020-09-24 ENCOUNTER — OFFICE VISIT (OUTPATIENT)
Dept: OBSTETRICS AND GYNECOLOGY | Facility: CLINIC | Age: 45
End: 2020-09-24
Attending: OBSTETRICS & GYNECOLOGY
Payer: COMMERCIAL

## 2020-09-24 ENCOUNTER — APPOINTMENT (OUTPATIENT)
Dept: RADIOLOGY | Facility: OTHER | Age: 45
End: 2020-09-24
Attending: OBSTETRICS & GYNECOLOGY
Payer: COMMERCIAL

## 2020-09-24 VITALS
SYSTOLIC BLOOD PRESSURE: 110 MMHG | WEIGHT: 194 LBS | BODY MASS INDEX: 29.4 KG/M2 | DIASTOLIC BLOOD PRESSURE: 74 MMHG | HEIGHT: 68 IN

## 2020-09-24 VITALS — WEIGHT: 192 LBS | BODY MASS INDEX: 29.1 KG/M2 | HEIGHT: 68 IN

## 2020-09-24 DIAGNOSIS — Z01.419 ENCOUNTER FOR GYNECOLOGICAL EXAMINATION: Primary | ICD-10-CM

## 2020-09-24 DIAGNOSIS — Z12.31 BREAST CANCER SCREENING BY MAMMOGRAM: ICD-10-CM

## 2020-09-24 DIAGNOSIS — Z87.440 HISTORY OF UTI: ICD-10-CM

## 2020-09-24 DIAGNOSIS — R87.610 ASCUS OF CERVIX WITH NEGATIVE HIGH RISK HPV: ICD-10-CM

## 2020-09-24 PROCEDURE — 77063 MAMMO DIGITAL SCREENING BILAT WITH TOMO: ICD-10-PCS | Mod: 26,,, | Performed by: RADIOLOGY

## 2020-09-24 PROCEDURE — 87624 HPV HI-RISK TYP POOLED RSLT: CPT

## 2020-09-24 PROCEDURE — 77067 SCR MAMMO BI INCL CAD: CPT | Mod: TC,PN

## 2020-09-24 PROCEDURE — 77063 BREAST TOMOSYNTHESIS BI: CPT | Mod: 26,,, | Performed by: RADIOLOGY

## 2020-09-24 PROCEDURE — 88141 PR  CYTOPATH CERV/VAG INTERPRET: ICD-10-PCS | Mod: ,,, | Performed by: PATHOLOGY

## 2020-09-24 PROCEDURE — 3008F PR BODY MASS INDEX (BMI) DOCUMENTED: ICD-10-PCS | Mod: CPTII,S$GLB,, | Performed by: OBSTETRICS & GYNECOLOGY

## 2020-09-24 PROCEDURE — 99999 PR PBB SHADOW E&M-EST. PATIENT-LVL III: ICD-10-PCS | Mod: PBBFAC,,, | Performed by: OBSTETRICS & GYNECOLOGY

## 2020-09-24 PROCEDURE — 99999 PR PBB SHADOW E&M-EST. PATIENT-LVL III: CPT | Mod: PBBFAC,,, | Performed by: OBSTETRICS & GYNECOLOGY

## 2020-09-24 PROCEDURE — 88175 CYTOPATH C/V AUTO FLUID REDO: CPT | Performed by: PATHOLOGY

## 2020-09-24 PROCEDURE — 3008F BODY MASS INDEX DOCD: CPT | Mod: CPTII,S$GLB,, | Performed by: OBSTETRICS & GYNECOLOGY

## 2020-09-24 PROCEDURE — 99396 PR PREVENTIVE VISIT,EST,40-64: ICD-10-PCS | Mod: S$GLB,,, | Performed by: OBSTETRICS & GYNECOLOGY

## 2020-09-24 PROCEDURE — 99396 PREV VISIT EST AGE 40-64: CPT | Mod: S$GLB,,, | Performed by: OBSTETRICS & GYNECOLOGY

## 2020-09-24 PROCEDURE — 88141 CYTOPATH C/V INTERPRET: CPT | Mod: ,,, | Performed by: PATHOLOGY

## 2020-09-24 PROCEDURE — 77067 SCR MAMMO BI INCL CAD: CPT | Mod: 26,,, | Performed by: RADIOLOGY

## 2020-09-24 PROCEDURE — 77067 MAMMO DIGITAL SCREENING BILAT WITH TOMO: ICD-10-PCS | Mod: 26,,, | Performed by: RADIOLOGY

## 2020-09-24 RX ORDER — VALACYCLOVIR HYDROCHLORIDE 1 G/1
1000 TABLET, FILM COATED ORAL CONTINUOUS PRN
Qty: 30 TABLET | Refills: 6 | Status: SHIPPED | OUTPATIENT
Start: 2020-09-24

## 2020-09-24 RX ORDER — NITROFURANTOIN (MACROCRYSTALS) 100 MG/1
100 CAPSULE ORAL
Qty: 30 CAPSULE | Refills: 6 | Status: SHIPPED | OUTPATIENT
Start: 2020-09-24

## 2020-09-24 NOTE — PROGRESS NOTES
Chief Complaint: well woman exam  Well Woman (Annual Exam)    She is established    Arcelia Moss is a 44 y.o. female  presents for a well woman exam.    No c/o    ROS:  No abdominal pain. No discharge  No breast pain or masses, No rectal bleeding       Cycles: regular and monthly- one cycle one week late  LMP: Patient's last menstrual period was 2020..    Contraception: The current method of family planning is vasectomy  Meds per MD: Macrodantin & Valtrex     Last Pap: 10/3/2018 pap ASCUS & hpv negative  Last MMG: today         Past Medical History:   Diagnosis Date    Meniere disease     Pregnancy induced    Polycystic ovarian syndrome        Past Surgical History:   Procedure Laterality Date     SECTION      DILATION AND CURETTAGE OF UTERUS      TUBAL LIGATION      then reversal due to pain     tubal reversal  2009    Due to pain after tubal ligation       OB History    Para Term  AB Living   4 2 2     3   SAB TAB Ectopic Multiple Live Births         1 3      # Outcome Date GA Lbr Perry/2nd Weight Sex Delivery Anes PTL Lv   4  07    F CS-LTranv   SHELTON   3A  06    M CS-LTranv Spinal  SHELTON      Complications: Hypertension complicating pregnancy in third trimester   3B  06    F CS-LTranv   SHELTON   2 Term            1 Term                Family History   Problem Relation Age of Onset    Osteoarthritis Mother     Meniere's disease Paternal Aunt     Kidney disease Maternal Grandmother     Diabetes Mellitus Maternal Grandmother     Hyperlipidemia Maternal Grandmother     Hypertension Maternal Grandmother     Ovarian cancer Neg Hx     Colon cancer Neg Hx     Breast cancer Neg Hx     Glaucoma Neg Hx     Inflammatory bowel disease Neg Hx     Lupus Neg Hx     Psoriasis Neg Hx     Thyroid disease Neg Hx        Social History     Tobacco Use    Smoking status: Never Smoker    Smokeless tobacco: Never Used    Tobacco comment:  "; counselor   Substance Use Topics    Alcohol use: No    Drug use: No         ROS:  GENERAL: Denies weight gain or weight loss. Feeling well overall.   SKIN: Denies rash or lesions.   HEENT: Denies headaches, or vision changes.   CARDIOVASCULAR: Denies palpitations or left sided chest pain.   RESPIRATORY: Denies shortness of breath or dyspnea on exertion.  BREASTS: Denies pain, lumps, or nipple discharge.   ABDOMEN: Denies abdominal pain, constipation, diarrhea, nausea, vomiting, change in appetite or rectal bleeding.   URINARY: Denies frequency, dysuria, hematuria.  NEUROLOGIC: Denies syncope or weakness.   PSYCHIATRIC: Denies depression, anxiety or mood swings.    Physical Exam:  /74   Ht 5' 8" (1.727 m)   Wt 88 kg (194 lb 0.1 oz)   LMP 09/07/2020   BMI 29.50 kg/m²     APPEARANCE: Well nourished, well developed, in no acute distress.  AFFECT: WNL, alert and oriented x 3  SKIN: No acne or hirsutism  BREASTS: Symmetrical, no skin changes.                      No nipple discharge.   No palpable masses bilaterally  NODES: No inguinal nor axillary LAD  ABDOMEN: soft Non tender Non distended No masses  PELVIC:   Normal external genitalia without lesions.  Normal hair distribution.   Adequate perineal body, normal urethral meatus.   No signif cystocele or rectocele.  Vagina moist and well rugated without lesions or discharge.    Cervix pink, without lesions, discharge or tenderness.     PAP performed   Bimanual exam shows uterus to be normal size, regular, mobile and nontender.    Adnexa without masses or tenderness.    EXTREMITIES: No edema.        ASSESSMENT AND PLAN  Arcelia was seen today for well woman.    Diagnoses and all orders for this visit:    Encounter for gynecological examination    History of UTI  -     nitrofurantoin (MACRODANTIN) 100 MG capsule; Take 1 capsule (100 mg total) by mouth as needed (intercourse).    ASCUS of cervix with negative high risk HPV  -     HPV High Risk Genotypes, " PCR  -     Liquid-Based Pap Smear, Screening    Other orders  -     valACYclovir (VALTREX) 1000 MG tablet; Take 1 tablet (1,000 mg total) by mouth continuous prn.        Patient was counseled today on A.C.S. Pap guidelines and recommendations for yearly pelvic exams, mammograms and monthly self breast exams; to see her PCP for other health maintenance.     Patient encouraged to register for portal and results will be sent via portal.       Health Maintenance   Topic Date Due    TETANUS VACCINE  12/31/1994    Mammogram  09/20/2021    Hepatitis C Screening  Completed    Lipid Panel  Completed

## 2020-09-24 NOTE — PROGRESS NOTES
LMP: Patient's last menstrual period was 09/07/2020..    Contraception: The current method of family planning is vasectomy  Meds per MD: Macrodantin & Valtrex    Last Pap: 10/3/2018 pap ASCUS & hpv negative  Last MMG: today

## 2020-09-28 ENCOUNTER — PATIENT MESSAGE (OUTPATIENT)
Dept: OBSTETRICS AND GYNECOLOGY | Facility: CLINIC | Age: 45
End: 2020-09-28

## 2020-09-30 LAB
HPV HR 12 DNA SPEC QL NAA+PROBE: NEGATIVE
HPV16 AG SPEC QL: NEGATIVE
HPV18 DNA SPEC QL NAA+PROBE: NEGATIVE

## 2020-10-15 LAB
FINAL PATHOLOGIC DIAGNOSIS: NORMAL
Lab: NORMAL

## 2020-10-20 ENCOUNTER — HOSPITAL ENCOUNTER (OUTPATIENT)
Dept: RADIOLOGY | Facility: HOSPITAL | Age: 45
Discharge: HOME | End: 2020-10-20
Attending: OBSTETRICS & GYNECOLOGY
Payer: COMMERCIAL

## 2020-10-20 DIAGNOSIS — R92.8 ABNORMAL MAMMOGRAM: ICD-10-CM

## 2020-10-20 PROCEDURE — 77065 MAMMO DIGITAL DIAGNOSTIC RIGHT WITH TOMO: ICD-10-PCS | Mod: 26,RT,, | Performed by: RADIOLOGY

## 2020-10-20 PROCEDURE — 77065 DX MAMMO INCL CAD UNI: CPT | Mod: TC,RT

## 2020-10-20 PROCEDURE — 76642 US BREAST RIGHT LIMITED: ICD-10-PCS | Mod: 26,RT,, | Performed by: RADIOLOGY

## 2020-10-20 PROCEDURE — 77061 BREAST TOMOSYNTHESIS UNI: CPT | Mod: TC,RT

## 2020-10-20 PROCEDURE — 76642 ULTRASOUND BREAST LIMITED: CPT | Mod: 26,RT,, | Performed by: RADIOLOGY

## 2020-10-20 PROCEDURE — 77061 BREAST TOMOSYNTHESIS UNI: CPT | Mod: 26,RT,, | Performed by: RADIOLOGY

## 2020-10-20 PROCEDURE — 76642 ULTRASOUND BREAST LIMITED: CPT | Mod: TC,RT

## 2020-10-20 PROCEDURE — 77061 MAMMO DIGITAL DIAGNOSTIC RIGHT WITH TOMO: ICD-10-PCS | Mod: 26,RT,, | Performed by: RADIOLOGY

## 2020-10-20 PROCEDURE — 77065 DX MAMMO INCL CAD UNI: CPT | Mod: 26,RT,, | Performed by: RADIOLOGY

## 2020-10-21 NOTE — PROGRESS NOTES
Arcelia,  Just wanted to let you know that I got your mammogram and ultrasound results back and the radiologist reading is perfectly fine. Just a small simple cyst. Nothing worrisome. No cancer.See report reading below....   Let me know if you have any questions or concerns  Hope you have a great night!  Dr Moore  Report findings:  There is a 5 mm oval simple cyst seen in the right breast at 6 o'clock corresponding to the mammographic finding.    There is no evidence of suspicious masses or other abnormal findings in the right breast.   Impression:  There is no mammographic or sonographic evidence of malignancy in the right breast.

## 2021-05-06 ENCOUNTER — PATIENT MESSAGE (OUTPATIENT)
Dept: RESEARCH | Facility: HOSPITAL | Age: 46
End: 2021-05-06

## 2021-11-12 ENCOUNTER — TELEPHONE (OUTPATIENT)
Dept: OBSTETRICS AND GYNECOLOGY | Facility: CLINIC | Age: 46
End: 2021-11-12
Payer: COMMERCIAL

## 2021-11-12 DIAGNOSIS — Z12.31 BREAST CANCER SCREENING BY MAMMOGRAM: Primary | ICD-10-CM

## 2021-12-06 ENCOUNTER — APPOINTMENT (OUTPATIENT)
Dept: RADIOLOGY | Facility: OTHER | Age: 46
End: 2021-12-06
Attending: OBSTETRICS & GYNECOLOGY
Payer: COMMERCIAL

## 2021-12-06 VITALS — HEIGHT: 68 IN | BODY MASS INDEX: 29.4 KG/M2 | WEIGHT: 194 LBS

## 2021-12-06 DIAGNOSIS — Z12.31 BREAST CANCER SCREENING BY MAMMOGRAM: ICD-10-CM

## 2021-12-06 PROCEDURE — 77067 SCR MAMMO BI INCL CAD: CPT | Mod: TC,PN

## 2021-12-06 PROCEDURE — 77067 MAMMO DIGITAL SCREENING BILAT WITH TOMO: ICD-10-PCS | Mod: 26,,, | Performed by: RADIOLOGY

## 2021-12-06 PROCEDURE — 77063 BREAST TOMOSYNTHESIS BI: CPT | Mod: 26,,, | Performed by: RADIOLOGY

## 2021-12-06 PROCEDURE — 77067 SCR MAMMO BI INCL CAD: CPT | Mod: 26,,, | Performed by: RADIOLOGY

## 2021-12-06 PROCEDURE — 77063 MAMMO DIGITAL SCREENING BILAT WITH TOMO: ICD-10-PCS | Mod: 26,,, | Performed by: RADIOLOGY

## 2023-12-06 RX ORDER — ONDANSETRON 4 MG/1
4 TABLET, ORALLY DISINTEGRATING ORAL EVERY 6 HOURS PRN
Qty: 30 TABLET | Refills: 1 | Status: SHIPPED | OUTPATIENT
Start: 2023-12-06

## 2024-02-28 ENCOUNTER — HOSPITAL ENCOUNTER (OUTPATIENT)
Dept: RADIOLOGY | Facility: CLINIC | Age: 49
Discharge: HOME | End: 2024-02-28
Payer: COMMERCIAL

## 2024-02-28 VITALS — BODY MASS INDEX: 29.5 KG/M2 | WEIGHT: 194 LBS

## 2024-02-28 DIAGNOSIS — Z12.31 ENCOUNTER FOR SCREENING MAMMOGRAM FOR BREAST CANCER: ICD-10-CM

## 2024-02-28 PROCEDURE — 77063 BREAST TOMOSYNTHESIS BI: CPT | Mod: 26,,, | Performed by: RADIOLOGY

## 2024-02-28 PROCEDURE — 77067 SCR MAMMO BI INCL CAD: CPT | Mod: 26,,, | Performed by: RADIOLOGY

## 2024-02-28 PROCEDURE — 77067 SCR MAMMO BI INCL CAD: CPT | Mod: TC,PO

## 2024-05-27 ENCOUNTER — TELEPHONE (OUTPATIENT)
Dept: OPHTHALMOLOGY | Facility: CLINIC | Age: 49
End: 2024-05-27
Payer: COMMERCIAL

## 2024-05-30 ENCOUNTER — TELEPHONE (OUTPATIENT)
Dept: OPTOMETRY | Facility: CLINIC | Age: 49
End: 2024-05-30
Payer: COMMERCIAL

## 2024-05-31 ENCOUNTER — OFFICE VISIT (OUTPATIENT)
Dept: OPTOMETRY | Facility: CLINIC | Age: 49
End: 2024-05-31
Payer: COMMERCIAL

## 2024-05-31 DIAGNOSIS — H04.123 DRY EYE SYNDROME, BILATERAL: ICD-10-CM

## 2024-05-31 DIAGNOSIS — H52.7 REFRACTIVE ERROR: ICD-10-CM

## 2024-05-31 DIAGNOSIS — Z01.00 EXAMINATION OF EYES AND VISION: Primary | ICD-10-CM

## 2024-05-31 PROCEDURE — 92004 COMPRE OPH EXAM NEW PT 1/>: CPT | Mod: S$GLB,,, | Performed by: OPTOMETRIST

## 2024-05-31 PROCEDURE — 99999 PR PBB SHADOW E&M-EST. PATIENT-LVL II: CPT | Mod: PBBFAC,,, | Performed by: OPTOMETRIST

## 2024-05-31 PROCEDURE — 92015 DETERMINE REFRACTIVE STATE: CPT | Mod: S$GLB,,, | Performed by: OPTOMETRIST

## 2024-05-31 PROCEDURE — 1160F RVW MEDS BY RX/DR IN RCRD: CPT | Mod: CPTII,S$GLB,, | Performed by: OPTOMETRIST

## 2024-05-31 PROCEDURE — 1159F MED LIST DOCD IN RCRD: CPT | Mod: CPTII,S$GLB,, | Performed by: OPTOMETRIST

## 2024-05-31 NOTE — PROGRESS NOTES
HPI    Pt here today for ocular health exam.   States blurred vision at near &   intermediate, distance vision good.    Denies any eye pain but does feel eye strain when working on computer.  Wearing otc +1.50 to +1.75    Would like specs rx today -- aware of refraction fees.    (+) headaches --- hx of migraines  (-) allergies / dry eyes  (-) gtts  (-) floaters or light flashes  Last edited by Bobby Hawk, OD on 5/31/2024  3:02 PM.            Assessment /Plan     For exam results, see Encounter Report.    Examination of eyes and vision    Refractive error    Dry eye syndrome, bilateral      1. Examination of eyes and vision    2. Refractive error  Dispensed updated spectacle Rx. Discussed various spectacle lens options -- recommend computer/near bifocal.   Discussed adaptation period to new specs.     Discussed cls candidacy -- schedule first time fitting prn    3. Dry eye syndrome, bilateral  Discussed ocular affects of dry eyes. Recommend OTC artificial tears 2-4 times a day in both eyes.   Discussed chronicity of ALEXUS and importance of continued drop use.   Monitor

## 2024-06-25 ENCOUNTER — OFFICE VISIT (OUTPATIENT)
Dept: OBSTETRICS AND GYNECOLOGY | Facility: CLINIC | Age: 49
End: 2024-06-25
Payer: COMMERCIAL

## 2024-06-25 VITALS — WEIGHT: 220.44 LBS | BODY MASS INDEX: 33.41 KG/M2 | HEIGHT: 68 IN

## 2024-06-25 DIAGNOSIS — Z01.419 ENCOUNTER FOR GYNECOLOGICAL EXAMINATION: Primary | ICD-10-CM

## 2024-06-25 DIAGNOSIS — Z12.4 ENCOUNTER FOR PAPANICOLAOU SMEAR FOR CERVICAL CANCER SCREENING: ICD-10-CM

## 2024-06-25 DIAGNOSIS — Z11.51 ENCOUNTER FOR SCREENING FOR HUMAN PAPILLOMAVIRUS (HPV): ICD-10-CM

## 2024-06-25 DIAGNOSIS — R63.5 WEIGHT GAIN: ICD-10-CM

## 2024-06-25 PROCEDURE — 3008F BODY MASS INDEX DOCD: CPT | Mod: CPTII,S$GLB,, | Performed by: OBSTETRICS & GYNECOLOGY

## 2024-06-25 PROCEDURE — 99386 PREV VISIT NEW AGE 40-64: CPT | Mod: S$GLB,,, | Performed by: OBSTETRICS & GYNECOLOGY

## 2024-06-25 PROCEDURE — 99999 PR PBB SHADOW E&M-EST. PATIENT-LVL III: CPT | Mod: PBBFAC,,, | Performed by: OBSTETRICS & GYNECOLOGY

## 2024-06-25 PROCEDURE — 1159F MED LIST DOCD IN RCRD: CPT | Mod: CPTII,S$GLB,, | Performed by: OBSTETRICS & GYNECOLOGY

## 2024-06-25 PROCEDURE — 87624 HPV HI-RISK TYP POOLED RSLT: CPT | Performed by: OBSTETRICS & GYNECOLOGY

## 2024-06-25 NOTE — PROGRESS NOTES
Chief Complaint: well woman exam  Annual Exam    She is not established as last seen     Arcelia Moss is a 48 y.o. female  presents for a well woman exam.    48-year-old last seen in .  Complains of weight gain-approximately 30 lb in the last 4-5 years and here for her well-woman exam.    Patient does not have a general primary care doctor.  Has not had recent lab work done.  She reports that her cycles are monthly and is not on any medication daily.  Birth control is vasectomy.    ROS:  Denies menopausal symptoms such as hot flashes night sweats etc  No abdominal pain. No vaginal discharge  No breast pain or masses, No rectal bleeding       Cycles:  regular and monthly  LMP: Patient's last menstrual period was 2024 (approximate)..    Contraception: The current method of family planning is vasectomy  Meds per MD: none     Last Pap:  pap & hpv negative  Last MM2024 birads 1 OHS  Last Colonoscopy: never -we discussed that I recommend colonoscopy between 45 and 50         Past Medical History:   Diagnosis Date    Meniere disease     Pregnancy induced    Polycystic ovarian syndrome        Past Surgical History:   Procedure Laterality Date     SECTION      DILATION AND CURETTAGE OF UTERUS      TUBAL LIGATION      then reversal due to pain     tubal reversal      Due to pain after tubal ligation       OB History    Para Term  AB Living   4 2 2     3   SAB IAB Ectopic Multiple Live Births         1 3      # Outcome Date GA Lbr Perry/2nd Weight Sex Type Anes PTL Lv   4  07    F CS-LTranv   SHELTON   3A  06    M CS-LTranv Spinal  SHELTON      Complications: Hypertension complicating pregnancy in third trimester   3B  06    F CS-LTranv   SHELTON   2 Term            1 Term                Family History   Problem Relation Name Age of Onset    Kidney disease Maternal Grandmother      Diabetes Mellitus Maternal Grandmother       "Hyperlipidemia Maternal Grandmother      Hypertension Maternal Grandmother      Osteoarthritis Mother      Meniere's disease Paternal Aunt      Ovarian cancer Neg Hx      Colon cancer Neg Hx      Breast cancer Neg Hx      Glaucoma Neg Hx      Inflammatory bowel disease Neg Hx      Lupus Neg Hx      Psoriasis Neg Hx      Thyroid disease Neg Hx         Social History     Tobacco Use    Smoking status: Never    Smokeless tobacco: Never    Tobacco comments:     ; counselor   Substance Use Topics    Alcohol use: No    Drug use: No         Physical Exam:  Ht 5' 8" (1.727 m)   Wt 100 kg (220 lb 7.4 oz)   LMP 05/21/2024 (Approximate)   BMI 33.52 kg/m²     APPEARANCE: Well nourished, well developed, in no acute distress.  AFFECT: WNL, alert and oriented x 3  SKIN: No acne or hirsutism  BREASTS: Symmetrical, no skin changes.                      No nipple discharge.   No palpable masses bilaterally  NODES: No inguinal nor axillary LAD  ABDOMEN: soft Non tender Non distended No masses  PELVIC:   Normal external genitalia without lesions.  Normal hair distribution.   Adequate perineal body, normal urethral meatus.   No signif cystocele or rectocele.  Vagina moist and well rugated without lesions or discharge.    Cervix pink, without lesions, discharge or tenderness.     PAP performed   Bimanual exam shows uterus to be normal size, regular, mobile and nontender.    Adnexa without masses or tenderness.    EXTREMITIES: No edema.        ASSESSMENT AND PLAN  Arcelia was seen today for annual exam.    Diagnoses and all orders for this visit:    Encounter for gynecological examination    Encounter for screening for human papillomavirus (HPV)  -     HPV High Risk Genotypes, PCR    Encounter for Papanicolaou smear for cervical cancer screening  -     Liquid-Based Pap Smear, Screening    Weight gain-long discussion with patient regarding weight gain in and aging process.  She is Luz menopausal and not postmenopausal as she is " still having cycles regularly and monthly.  Patient requesting hormone levels but did explain that hormone levels are not going to be helpful as her cycles are fairly regular and monthly.  We discussed options of weight loss if all of her lab work was normal.  We talked about the pros and cons of Ozempic as well as Monjauro.  Patient trying currently to exercise and eat better.  We briefly talked about Grand diet.  Plan is to check labs and see if there is a thyroid or other issue that could be causing the weight gain.  -     Estradiol; Future  -     Follicle Stimulating Hormone; Future  -     TSH; Future  -     CBC Auto Differential; Future  -     Comprehensive Metabolic Panel; Future  -     Lipid Panel; Future  -     TSH; Future  -     Vitamin D; Future    Once labs are drawn will follow-up on lab work and then may longer term recommendation.  Recommended for patient to see Dr. yepez or Reginaldo on the [place].  Patient to have her labs at [place].      Follow up if symptoms worsen or fail to improve.     Patient was counseled today on A.C.S. Pap guidelines and recommendations for yearly pelvic exams, mammograms and monthly self breast exams; to see her PCP for other health maintenance.     Patient encouraged to register for portal and results will be sent via portal.       Health Maintenance   Topic Date Due    Lipid Panel  01/24/2010    Colorectal Cancer Screening  Never done    Mammogram  02/28/2025    TETANUS VACCINE  01/25/2027    Hepatitis C Screening  Completed

## 2024-06-25 NOTE — PROGRESS NOTES
LMP: Patient's last menstrual period was 2024 (approximate)..    Contraception: The current method of family planning is vasectomy  Meds per MD: none    Last Pap:  pap & hpv negative  Last MM2024 birads 1 OHS  Last Colonoscopy: never

## 2024-06-26 ENCOUNTER — PATIENT MESSAGE (OUTPATIENT)
Dept: OBSTETRICS AND GYNECOLOGY | Facility: CLINIC | Age: 49
End: 2024-06-26
Payer: COMMERCIAL

## 2024-06-26 ENCOUNTER — LAB VISIT (OUTPATIENT)
Dept: LAB | Facility: HOSPITAL | Age: 49
End: 2024-06-26
Attending: OBSTETRICS & GYNECOLOGY
Payer: COMMERCIAL

## 2024-06-26 DIAGNOSIS — R63.5 WEIGHT GAIN: ICD-10-CM

## 2024-06-26 LAB
25(OH)D3+25(OH)D2 SERPL-MCNC: 32 NG/ML (ref 30–96)
ALBUMIN SERPL BCP-MCNC: 3.8 G/DL (ref 3.5–5.2)
ALP SERPL-CCNC: 75 U/L (ref 55–135)
ALT SERPL W/O P-5'-P-CCNC: 17 U/L (ref 10–44)
ANION GAP SERPL CALC-SCNC: 10 MMOL/L (ref 8–16)
AST SERPL-CCNC: 17 U/L (ref 10–40)
BASOPHILS # BLD AUTO: 0.05 K/UL (ref 0–0.2)
BASOPHILS NFR BLD: 0.8 % (ref 0–1.9)
BILIRUB SERPL-MCNC: 0.4 MG/DL (ref 0.1–1)
BUN SERPL-MCNC: 17 MG/DL (ref 6–20)
CALCIUM SERPL-MCNC: 9.1 MG/DL (ref 8.7–10.5)
CHLORIDE SERPL-SCNC: 104 MMOL/L (ref 95–110)
CHOLEST SERPL-MCNC: 179 MG/DL (ref 120–199)
CHOLEST/HDLC SERPL: 3.1 {RATIO} (ref 2–5)
CO2 SERPL-SCNC: 25 MMOL/L (ref 23–29)
CREAT SERPL-MCNC: 0.9 MG/DL (ref 0.5–1.4)
DIFFERENTIAL METHOD BLD: ABNORMAL
EOSINOPHIL # BLD AUTO: 0.5 K/UL (ref 0–0.5)
EOSINOPHIL NFR BLD: 7.9 % (ref 0–8)
ERYTHROCYTE [DISTWIDTH] IN BLOOD BY AUTOMATED COUNT: 13.5 % (ref 11.5–14.5)
EST. GFR  (NO RACE VARIABLE): >60 ML/MIN/1.73 M^2
ESTRADIOL SERPL-MCNC: 18 PG/ML
FSH SERPL-ACNC: 74.34 MIU/ML
GLUCOSE SERPL-MCNC: 86 MG/DL (ref 70–110)
HCT VFR BLD AUTO: 42.6 % (ref 37–48.5)
HDLC SERPL-MCNC: 58 MG/DL (ref 40–75)
HDLC SERPL: 32.4 % (ref 20–50)
HGB BLD-MCNC: 13 G/DL (ref 12–16)
IMM GRANULOCYTES # BLD AUTO: 0.01 K/UL (ref 0–0.04)
IMM GRANULOCYTES NFR BLD AUTO: 0.2 % (ref 0–0.5)
LDLC SERPL CALC-MCNC: 105 MG/DL (ref 63–159)
LYMPHOCYTES # BLD AUTO: 2 K/UL (ref 1–4.8)
LYMPHOCYTES NFR BLD: 32.8 % (ref 18–48)
MCH RBC QN AUTO: 27.4 PG (ref 27–31)
MCHC RBC AUTO-ENTMCNC: 30.5 G/DL (ref 32–36)
MCV RBC AUTO: 90 FL (ref 82–98)
MONOCYTES # BLD AUTO: 0.5 K/UL (ref 0.3–1)
MONOCYTES NFR BLD: 8.7 % (ref 4–15)
NEUTROPHILS # BLD AUTO: 3 K/UL (ref 1.8–7.7)
NEUTROPHILS NFR BLD: 49.6 % (ref 38–73)
NONHDLC SERPL-MCNC: 121 MG/DL
NRBC BLD-RTO: 0 /100 WBC
PLATELET # BLD AUTO: 244 K/UL (ref 150–450)
PMV BLD AUTO: 10.5 FL (ref 9.2–12.9)
POTASSIUM SERPL-SCNC: 4.2 MMOL/L (ref 3.5–5.1)
PROT SERPL-MCNC: 7.5 G/DL (ref 6–8.4)
RBC # BLD AUTO: 4.75 M/UL (ref 4–5.4)
SODIUM SERPL-SCNC: 139 MMOL/L (ref 136–145)
TRIGL SERPL-MCNC: 80 MG/DL (ref 30–150)
TSH SERPL DL<=0.005 MIU/L-ACNC: 1.59 UIU/ML (ref 0.4–4)
TSH SERPL DL<=0.005 MIU/L-ACNC: 1.59 UIU/ML (ref 0.4–4)
WBC # BLD AUTO: 5.98 K/UL (ref 3.9–12.7)

## 2024-06-26 PROCEDURE — 85025 COMPLETE CBC W/AUTO DIFF WBC: CPT | Performed by: OBSTETRICS & GYNECOLOGY

## 2024-06-26 PROCEDURE — 80053 COMPREHEN METABOLIC PANEL: CPT | Performed by: OBSTETRICS & GYNECOLOGY

## 2024-06-26 PROCEDURE — 82670 ASSAY OF TOTAL ESTRADIOL: CPT | Performed by: OBSTETRICS & GYNECOLOGY

## 2024-06-26 PROCEDURE — 83001 ASSAY OF GONADOTROPIN (FSH): CPT | Performed by: OBSTETRICS & GYNECOLOGY

## 2024-06-26 PROCEDURE — 82306 VITAMIN D 25 HYDROXY: CPT | Performed by: OBSTETRICS & GYNECOLOGY

## 2024-06-26 PROCEDURE — 36415 COLL VENOUS BLD VENIPUNCTURE: CPT | Mod: PO | Performed by: OBSTETRICS & GYNECOLOGY

## 2024-06-26 PROCEDURE — 84443 ASSAY THYROID STIM HORMONE: CPT | Performed by: OBSTETRICS & GYNECOLOGY

## 2024-06-26 PROCEDURE — 80061 LIPID PANEL: CPT | Performed by: OBSTETRICS & GYNECOLOGY

## 2024-07-09 DIAGNOSIS — B96.89 BV (BACTERIAL VAGINOSIS): Primary | ICD-10-CM

## 2024-07-09 DIAGNOSIS — N76.0 BV (BACTERIAL VAGINOSIS): Primary | ICD-10-CM

## 2024-07-09 RX ORDER — METRONIDAZOLE 7.5 MG/G
1 GEL VAGINAL DAILY
Qty: 70 G | Refills: 0 | Status: SHIPPED | OUTPATIENT
Start: 2024-07-09 | End: 2024-07-14

## 2024-07-30 ENCOUNTER — PATIENT MESSAGE (OUTPATIENT)
Dept: OBSTETRICS AND GYNECOLOGY | Facility: CLINIC | Age: 49
End: 2024-07-30
Payer: COMMERCIAL

## 2024-07-30 NOTE — TELEPHONE ENCOUNTER
Perimenopasual   No cycle now for 10 weeks   LMP 5/21/24  Had labs already  Lets schedule appt to talk about options within the next month  Can do a virtual ines since she was jsut examined

## 2024-08-20 ENCOUNTER — OFFICE VISIT (OUTPATIENT)
Dept: OBSTETRICS AND GYNECOLOGY | Facility: CLINIC | Age: 49
End: 2024-08-20
Payer: COMMERCIAL

## 2024-08-20 DIAGNOSIS — N91.5 OLIGOMENORRHEA, UNSPECIFIED TYPE: ICD-10-CM

## 2024-08-20 DIAGNOSIS — R61 NIGHT SWEATS: ICD-10-CM

## 2024-08-20 DIAGNOSIS — G47.00 INSOMNIA, UNSPECIFIED TYPE: ICD-10-CM

## 2024-08-20 DIAGNOSIS — N95.1 PERIMENOPAUSAL: Primary | ICD-10-CM

## 2024-08-20 PROCEDURE — 99213 OFFICE O/P EST LOW 20 MIN: CPT | Mod: 95,,, | Performed by: OBSTETRICS & GYNECOLOGY

## 2024-08-20 RX ORDER — PROGESTERONE 100 MG/1
100 CAPSULE ORAL NIGHTLY
Qty: 30 CAPSULE | Refills: 11 | Status: SHIPPED | OUTPATIENT
Start: 2024-08-20 | End: 2025-08-20

## 2024-08-20 NOTE — PROGRESS NOTES
The patient location is: [facility]  The chief complaint leading to consultation is: No period   Visit type: audiovisual  Total time spent with patient: 12 min    Each patient to whom he or she provides medical services by telemedicine is:  (1) informed of the relationship between the physician and patient and the respective role of any other health care provider with respect to management of the patient; and (2) notified that he or she may decline to receive medical services by telemedicine and may withdraw from such care at any time.    Notes:     Chief Complaint: No period for 3 months and feels terrible     HPI:      Arcelia Moss is a 48 y.o.  who presents complaining of perimenopausal sx: night sweats, trouble sleeping, PMS feeling  all started 3 mo ago after last visit  No cycle now for 3 months  LMP 24  Had labs already FSH 74/ E18/ TSH 1.5    Patient does not have regular monthly menses. No LMP recorded.      ROS:     GENERAL: Denies fevers or chills. Feeling well overall.   ABDOMEN: Denies abdominal pain, constipation, diarrhea, nausea, vomiting, change in appetite.   URINARY: Denies frequency, dysuria, hematuria.  GYNECOLOGIC: See HPI.  NEUROLOGIC: Denies syncope or weakness.     Physical Exam:      PHYSICAL EXAM:  There were no vitals taken for this visit.  There is no height or weight on file to calculate BMI.     APPEARANCE: Well nourished, well developed, in no acute distress.  Exam deferred due to televisit    Results:          Assessment/Plan:     Perimenopausal  -     progesterone (PROMETRIUM) 100 MG capsule; Take 1 capsule (100 mg total) by mouth nightly.  Dispense: 30 capsule; Refill: 11    Insomnia, unspecified type    Night sweats    Oligomenorrhea, unspecified type    Will try nightly progesterone    If this does not help consider adding estrogen - based off labs   For now will start with progesterone and f/u in 2-3 months         Counseling:       Use of the [facility]  Patient Portal discussed and encouraged during today's visit.

## 2025-01-04 ENCOUNTER — PATIENT MESSAGE (OUTPATIENT)
Dept: OBSTETRICS AND GYNECOLOGY | Facility: CLINIC | Age: 50
End: 2025-01-04
Payer: COMMERCIAL

## 2025-03-03 ENCOUNTER — HOSPITAL ENCOUNTER (OUTPATIENT)
Dept: RADIOLOGY | Facility: CLINIC | Age: 50
Discharge: HOME | End: 2025-03-03
Attending: OBSTETRICS & GYNECOLOGY
Payer: COMMERCIAL

## 2025-03-03 DIAGNOSIS — Z12.31 ENCOUNTER FOR SCREENING MAMMOGRAM FOR BREAST CANCER: ICD-10-CM

## 2025-03-03 PROCEDURE — 77067 SCR MAMMO BI INCL CAD: CPT | Mod: 26,,, | Performed by: RADIOLOGY

## 2025-03-03 PROCEDURE — 77063 BREAST TOMOSYNTHESIS BI: CPT | Mod: 26,,, | Performed by: RADIOLOGY

## 2025-03-03 PROCEDURE — 77067 SCR MAMMO BI INCL CAD: CPT | Mod: TC,PO

## 2025-03-04 ENCOUNTER — RESULTS FOLLOW-UP (OUTPATIENT)
Dept: OBSTETRICS AND GYNECOLOGY | Facility: CLINIC | Age: 50
End: 2025-03-04
Payer: COMMERCIAL